# Patient Record
Sex: MALE | Race: WHITE | NOT HISPANIC OR LATINO | URBAN - NONMETROPOLITAN AREA
[De-identification: names, ages, dates, MRNs, and addresses within clinical notes are randomized per-mention and may not be internally consistent; named-entity substitution may affect disease eponyms.]

---

## 2021-02-10 ENCOUNTER — OFFICE VISIT (OUTPATIENT)
Dept: URBAN - NONMETROPOLITAN AREA SURGERY CENTER 1 | Facility: SURGERY CENTER | Age: 66
End: 2021-02-10

## 2021-02-16 ENCOUNTER — OFFICE VISIT (OUTPATIENT)
Dept: URBAN - NONMETROPOLITAN AREA SURGERY CENTER 1 | Facility: SURGERY CENTER | Age: 66
End: 2021-02-16
Payer: MEDICARE

## 2021-02-16 ENCOUNTER — CLAIMS CREATED FROM THE CLAIM WINDOW (OUTPATIENT)
Dept: URBAN - METROPOLITAN AREA CLINIC 4 | Facility: CLINIC | Age: 66
End: 2021-02-16
Payer: MEDICARE

## 2021-02-16 DIAGNOSIS — Z80.0 FAMILY HISTORY MALIGNANT NEOPLASM OF BILIARY TRACT: ICD-10-CM

## 2021-02-16 DIAGNOSIS — Z12.11 COLON CANCER SCREENING: ICD-10-CM

## 2021-02-16 DIAGNOSIS — D12.2 BENIGN NEOPLASM OF ASCENDING COLON: ICD-10-CM

## 2021-02-16 DIAGNOSIS — D12.2 ADENOMA OF ASCENDING COLON: ICD-10-CM

## 2021-02-16 PROCEDURE — 45385 COLONOSCOPY W/LESION REMOVAL: CPT | Performed by: INTERNAL MEDICINE

## 2021-02-16 PROCEDURE — G8907 PT DOC NO EVENTS ON DISCHARG: HCPCS | Performed by: INTERNAL MEDICINE

## 2021-02-16 PROCEDURE — 88305 TISSUE EXAM BY PATHOLOGIST: CPT | Performed by: PATHOLOGY

## 2021-02-26 ENCOUNTER — TELEPHONE ENCOUNTER (OUTPATIENT)
Dept: URBAN - NONMETROPOLITAN AREA CLINIC 1 | Facility: CLINIC | Age: 66
End: 2021-02-26

## 2021-04-26 ENCOUNTER — OFFICE VISIT (OUTPATIENT)
Dept: URBAN - NONMETROPOLITAN AREA CLINIC 2 | Facility: CLINIC | Age: 66
End: 2021-04-26
Payer: MEDICARE

## 2021-04-26 DIAGNOSIS — R63.4 WEIGHT LOSS: ICD-10-CM

## 2021-04-26 DIAGNOSIS — F41.9 ANXIETY: ICD-10-CM

## 2021-04-26 DIAGNOSIS — Z12.11 COLON CANCER SCREENING: ICD-10-CM

## 2021-04-26 DIAGNOSIS — R79.89 ELEVATED LIVER FUNCTION TESTS: ICD-10-CM

## 2021-04-26 DIAGNOSIS — Z86.010 PERSONAL HISTORY OF COLONIC POLYPS: ICD-10-CM

## 2021-04-26 PROCEDURE — 99214 OFFICE O/P EST MOD 30 MIN: CPT | Performed by: NURSE PRACTITIONER

## 2021-04-26 NOTE — PHYSICAL EXAM NEUROLOGIC:
oriented to person, place and time , cranial nerves 2-12 grossly intact
no LOB or unsteadiness observed/decreased step length/decreased weight-shifting ability

## 2021-04-26 NOTE — HPI-OTHER HISTORIES
4/26/21 Mr. Tyler Pickard is a 64 yo male who is referred by Dr. Pieter Negro for elevated LFTs. A copy of this document will be forwarded to the referring provider. He does not recall having elevated liver tests in the past. Recent labs reviewed. See below. He began with hyperbilirubinemia in November and had follow up labs March with hepatocellular injury. Labs improved at follow up although his bilirubin is up to 1.6. CTshows normal liver and no etiology for elevated LFTs.   Overall he is healthy. He denies any significant problems other than anxiety. Earlier this year, he started intermittent fasting and picked up running for the first time in years. He unexpectedly loss 15# at his follow up with Dr. Negro and has resumed his typical diet. He has put some of the weight back on.  He is a social drinker, no more than 8 drinks in a week but cut back in January due to anxiety and hoping this would alleviate some of his symptoms. He may have 1-2 glasses of wine a week now. Denies history of heavier etoh use. No history of accidental needle pricks, IV drug abuse, blood transfusion, tattoos. He did have jaundice as a child and was on bedrest for 1 week and his symptoms resolved. Denies f/h of liver disease.  Denies any recent viral illnesses. He did have an episode of fatigue and generally feeling unwell in January. No fever, chills. He was well enough to continue ADLs. Tested negative for COVID. Denies sick contacts. He is an . No recent travel other than to his wife's hotel in [place].   He did increase statin dosage prior to elevated LFTs but has stopped it for 6 weeks now. Also started herbal supplements and Neal tea 1 cup per day that he has now stopped. He was taking Trazodone for sleep but has stopped that for 6 weeks now and is sleeping well with melatonin 1mg nightly.   Labs: 4/7/21: ALT, 60, AST 50, alk phos 91, t bili 1.6, direct bili 0.6, indirect 1.0 3/18/21: , AST 53, alk phos 96, t bili 1.0, direct 0.35, indirect 0.75 3/4/21: ALT 74, AST 49, alk phos 61, t bili 1.6, albumin normal, CBC including plt normal. 11/3/20: ALT 38, AST 34, alk phos 71, t bili 1.9, Albumin normal, cbc including plt normal.  Recent imaging: CT A/P with contrast 3/9/21-liver unremarkable, normal gallbladder, normal bile ducts; enlarged prostate.  Colonoscopy Dr. awan 2/16/21: 2 TAs and diverticulosis--to repeat in 5 years. Noted to have f/h colon cancer in his father. TG

## 2021-05-03 ENCOUNTER — TELEPHONE ENCOUNTER (OUTPATIENT)
Dept: URBAN - METROPOLITAN AREA CLINIC 92 | Facility: CLINIC | Age: 66
End: 2021-05-03

## 2021-05-03 LAB
ACTIN (SMOOTH MUSCLE) ANTIBODY: 61
ALBUMIN: 4.7
ALKALINE PHOSPHATASE: 73
ALPHA-1-ANTITRYPSIN, SERUM: 112
ALT (SGPT): 40
ANA DIRECT: NEGATIVE
AST (SGOT): 35
BASO (ABSOLUTE): 0
BASOS: 1
BILIRUBIN, DIRECT: 0.42
BILIRUBIN, TOTAL: 1.5
BUN/CREATININE RATIO: 15
BUN: 14
CARBON DIOXIDE, TOTAL: 24
CERULOPLASMIN: 20
CHLORIDE: 103
CREATININE: 0.92
EGFR IF AFRICN AM: 100
EGFR IF NONAFRICN AM: 86
EOS (ABSOLUTE): 0
EOS: 0
FERRITIN, SERUM: 181
GGT: 164
GLUCOSE: 82
HBSAG SCREEN: NEGATIVE
HEMATOCRIT: 42.8
HEMATOLOGY COMMENTS:: (no result)
HEMOGLOBIN: 14.3
HEP A AB, IGM: NEGATIVE
HEP B CORE AB, IGM: NEGATIVE
HEP C VIRUS AB: <0.1
IMMATURE CELLS: (no result)
IMMATURE GRANS (ABS): 0
IMMATURE GRANULOCYTES: 0
IMMUNOGLOBULIN A, QN, SERUM: 131
IMMUNOGLOBULIN G, QN, SERUM: 929
INR: 1.1
IRON BIND.CAP.(TIBC): 265
IRON SATURATION: 46
IRON: 122
LIVER-KIDNEY MICROSOMAL AB: 1
LYMPHS (ABSOLUTE): 0.9
LYMPHS: 14
MCH: 32.1
MCHC: 33.4
MCV: 96
MITOCHONDRIAL (M2) ANTIBODY: <20
MONOCYTES(ABSOLUTE): 0.4
MONOCYTES: 6
NEUTROPHILS (ABSOLUTE): 5
NEUTROPHILS: 79
NRBC: (no result)
PLATELETS: 276
POTASSIUM: 4.4
PROTEIN, TOTAL: 6.5
PROTHROMBIN TIME: 11.5
RBC: 4.45
RDW: 12
SODIUM: 142
T-TRANSGLUTAMINASE (TTG) IGA: <2
UIBC: 143
WBC: 6.3

## 2021-05-11 ENCOUNTER — TELEPHONE ENCOUNTER (OUTPATIENT)
Dept: URBAN - METROPOLITAN AREA CLINIC 92 | Facility: CLINIC | Age: 66
End: 2021-05-11

## 2021-05-11 ENCOUNTER — TELEPHONE ENCOUNTER (OUTPATIENT)
Dept: URBAN - METROPOLITAN AREA CLINIC 23 | Facility: CLINIC | Age: 66
End: 2021-05-11

## 2021-05-18 ENCOUNTER — TELEPHONE ENCOUNTER (OUTPATIENT)
Dept: URBAN - METROPOLITAN AREA CLINIC 23 | Facility: CLINIC | Age: 66
End: 2021-05-18

## 2021-07-05 ENCOUNTER — LAB OUTSIDE AN ENCOUNTER (OUTPATIENT)
Dept: URBAN - METROPOLITAN AREA CLINIC 92 | Facility: CLINIC | Age: 66
End: 2021-07-05

## 2021-07-23 LAB
ACTIN (SMOOTH MUSCLE) ANTIBODY: 81
ALBUMIN: 4.5
ALKALINE PHOSPHATASE: 78
ALT (SGPT): 34
AST (SGOT): 40
BILIRUBIN, DIRECT: 0.41
BILIRUBIN, TOTAL: 1.4
BUN/CREATININE RATIO: 13
BUN: 12
CARBON DIOXIDE, TOTAL: 21
CHLORIDE: 102
CREATININE: 0.92
EGFR IF AFRICN AM: 100
EGFR IF NONAFRICN AM: 86
GGT: 95
GLUCOSE: 83
HEMATOCRIT: 42.4
HEMOGLOBIN: 14
INR: 1.1
MCH: 31.2
MCHC: 33
MCV: 94
NRBC: (no result)
PLATELETS: 250
POTASSIUM: 3.9
PROTEIN, TOTAL: 6.6
PROTHROMBIN TIME: 11.6
RBC: 4.49
RDW: 11.9
SODIUM: 141
WBC: 6.1

## 2021-07-26 ENCOUNTER — WEB ENCOUNTER (OUTPATIENT)
Dept: URBAN - NONMETROPOLITAN AREA CLINIC 2 | Facility: CLINIC | Age: 66
End: 2021-07-26

## 2021-07-26 ENCOUNTER — OFFICE VISIT (OUTPATIENT)
Dept: URBAN - NONMETROPOLITAN AREA CLINIC 2 | Facility: CLINIC | Age: 66
End: 2021-07-26
Payer: MEDICARE

## 2021-07-26 DIAGNOSIS — Z12.11 COLON CANCER SCREENING: ICD-10-CM

## 2021-07-26 DIAGNOSIS — F41.9 ANXIETY: ICD-10-CM

## 2021-07-26 DIAGNOSIS — R79.89 ELEVATED LIVER FUNCTION TESTS: ICD-10-CM

## 2021-07-26 DIAGNOSIS — R63.4 WEIGHT LOSS: ICD-10-CM

## 2021-07-26 DIAGNOSIS — Z86.010 PERSONAL HISTORY OF COLONIC POLYPS: ICD-10-CM

## 2021-07-26 PROCEDURE — 99214 OFFICE O/P EST MOD 30 MIN: CPT | Performed by: INTERNAL MEDICINE

## 2021-07-26 RX ORDER — ASCORBIC ACID 1000 MG
1 TABLET TABLET ORAL ONCE A DAY
Status: ACTIVE | COMMUNITY

## 2021-07-26 RX ORDER — DORZOLAMIDE HCL 20 MG/ML
1 DROP INTO AFFECTED EYE SOLUTION/ DROPS OPHTHALMIC THREE TIMES A DAY
Status: ACTIVE | COMMUNITY

## 2021-07-26 RX ORDER — POLYMYXIN B SULFATE AND TRIMETHOPRIM SULFATE 100000; 1 [USP'U]/ML; MG/ML
1 DROP INTO AFFECTED EYE SOLUTION/ DROPS OPHTHALMIC
Status: ACTIVE | COMMUNITY

## 2021-07-26 NOTE — HPI-OTHER HISTORIES
4/26/21 Mr. Tyler Pickard is a 66 yo male who is referred by Dr. Pieter Negro for elevated LFTs. A copy of this document will be forwarded to the referring provider. He does not recall having elevated liver tests in the past. Recent labs reviewed. See below. He began with hyperbilirubinemia in November and had follow up labs March with hepatocellular injury. Labs improved at follow up although his bilirubin is up to 1.6. CTshows normal liver and no etiology for elevated LFTs.   Overall he is healthy. He denies any significant problems other than anxiety. Earlier this year, he started intermittent fasting and picked up running for the first time in years. He unexpectedly loss 15# at his follow up with Dr. Negro and has resumed his typical diet. He has put some of the weight back on.  He is a social drinker, no more than 8 drinks in a week but cut back in January due to anxiety and hoping this would alleviate some of his symptoms. He may have 1-2 glasses of wine a week now. Denies history of heavier etoh use. No history of accidental needle pricks, IV drug abuse, blood transfusion, tattoos. He did have jaundice as a child and was on bedrest for 1 week and his symptoms resolved. Denies f/h of liver disease.  Denies any recent viral illnesses. He did have an episode of fatigue and generally feeling unwell in January. No fever, chills. He was well enough to continue ADLs. Tested negative for COVID. Denies sick contacts. He is an . No recent travel other than to his wife's hotel in [place].   He did increase statin dosage prior to elevated LFTs but has stopped it for 6 weeks now. Also started herbal supplements and Neal tea 1 cup per day that he has now stopped. He was taking Trazodone for sleep but has stopped that for 6 weeks now and is sleeping well with melatonin 1mg nightly.   Labs: 4/7/21: ALT, 60, AST 50, alk phos 91, t bili 1.6, direct bili 0.6, indirect 1.0 3/18/21: , AST 53, alk phos 96, t bili 1.0, direct 0.35, indirect 0.75 3/4/21: ALT 74, AST 49, alk phos 61, t bili 1.6, albumin normal, CBC including plt normal. 11/3/20: ALT 38, AST 34, alk phos 71, t bili 1.9, Albumin normal, cbc including plt normal.  Recent imaging: CT A/P with contrast 3/9/21-liver unremarkable, normal gallbladder, normal bile ducts; enlarged prostate.  Colonoscopy Dr. awan 2/16/21: 2 TAs and diverticulosis--to repeat in 5 years. Noted to have f/h colon cancer in his father. TG  7/26/21 Patient presents for follow up for abnormal LFTs. His LFTs on labs last week are normal except for bilirubin of 1.4. GGT has improved from 164 to 95. Smooth Muscle antibody has increased from 61 to 81. He is not drinking etoh. No new medications. He has no complaints today. TG

## 2021-07-28 ENCOUNTER — TELEPHONE ENCOUNTER (OUTPATIENT)
Dept: URBAN - METROPOLITAN AREA CLINIC 92 | Facility: CLINIC | Age: 66
End: 2021-07-28

## 2021-08-12 ENCOUNTER — TELEPHONE ENCOUNTER (OUTPATIENT)
Dept: URBAN - NONMETROPOLITAN AREA CLINIC 2 | Facility: CLINIC | Age: 66
End: 2021-08-12

## 2021-10-25 ENCOUNTER — OFFICE VISIT (OUTPATIENT)
Dept: URBAN - NONMETROPOLITAN AREA CLINIC 2 | Facility: CLINIC | Age: 66
End: 2021-10-25

## 2021-10-26 ENCOUNTER — LAB OUTSIDE AN ENCOUNTER (OUTPATIENT)
Dept: URBAN - NONMETROPOLITAN AREA CLINIC 2 | Facility: CLINIC | Age: 66
End: 2021-10-26

## 2021-10-27 LAB
ACTIN (SMOOTH MUSCLE) ANTIBODY: 43
ALBUMIN: 4.4
ALKALINE PHOSPHATASE: 75
ALT (SGPT): 20
AST (SGOT): 28
BASO (ABSOLUTE): 0
BASOS: 1
BILIRUBIN, DIRECT: 0.31
BILIRUBIN, TOTAL: 1.1
BUN/CREATININE RATIO: 15
BUN: 13
CARBON DIOXIDE, TOTAL: 23
CHLORIDE: 105
CREATININE: 0.86
EGFR IF AFRICN AM: 104
EGFR IF NONAFRICN AM: 90
EOS (ABSOLUTE): 0
EOS: 1
GGT: 62
GLUCOSE: 99
HEMATOCRIT: 42.3
HEMATOLOGY COMMENTS:: (no result)
HEMOGLOBIN: 14.2
IMMATURE CELLS: (no result)
IMMATURE GRANS (ABS): 0
IMMATURE GRANULOCYTES: 0
INR: 1
LYMPHS (ABSOLUTE): 0.9
LYMPHS: 16
MCH: 31.4
MCHC: 33.6
MCV: 94
MONOCYTES(ABSOLUTE): 0.4
MONOCYTES: 7
NEUTROPHILS (ABSOLUTE): 4.2
NEUTROPHILS: 75
NRBC: (no result)
PLATELETS: 265
POTASSIUM: 4.8
PROTEIN, TOTAL: 6.7
PROTHROMBIN TIME: 11
RBC: 4.52
RDW: 12.4
SODIUM: 141
WBC: 5.5

## 2021-10-28 ENCOUNTER — OFFICE VISIT (OUTPATIENT)
Dept: URBAN - NONMETROPOLITAN AREA CLINIC 13 | Facility: CLINIC | Age: 66
End: 2021-10-28
Payer: MEDICARE

## 2021-10-28 VITALS
HEIGHT: 71 IN | BODY MASS INDEX: 20.97 KG/M2 | WEIGHT: 149.8 LBS | DIASTOLIC BLOOD PRESSURE: 116 MMHG | SYSTOLIC BLOOD PRESSURE: 196 MMHG | HEART RATE: 70 BPM

## 2021-10-28 DIAGNOSIS — Z12.11 COLON CANCER SCREENING: ICD-10-CM

## 2021-10-28 DIAGNOSIS — R79.89 ELEVATED LIVER FUNCTION TESTS: ICD-10-CM

## 2021-10-28 DIAGNOSIS — Z86.010 PERSONAL HISTORY OF COLONIC POLYPS: ICD-10-CM

## 2021-10-28 DIAGNOSIS — F41.9 ANXIETY: ICD-10-CM

## 2021-10-28 DIAGNOSIS — R63.4 WEIGHT LOSS: ICD-10-CM

## 2021-10-28 PROCEDURE — 99214 OFFICE O/P EST MOD 30 MIN: CPT | Performed by: NURSE PRACTITIONER

## 2021-10-28 RX ORDER — ASCORBIC ACID 1000 MG
1 TABLET TABLET ORAL ONCE A DAY
Status: ACTIVE | COMMUNITY

## 2021-10-28 RX ORDER — DORZOLAMIDE HCL 20 MG/ML
1 DROP INTO AFFECTED EYE SOLUTION/ DROPS OPHTHALMIC THREE TIMES A DAY
Status: ACTIVE | COMMUNITY

## 2021-10-28 RX ORDER — POLYMYXIN B SULFATE AND TRIMETHOPRIM SULFATE 100000; 1 [USP'U]/ML; MG/ML
1 DROP INTO AFFECTED EYE SOLUTION/ DROPS OPHTHALMIC
Status: ACTIVE | COMMUNITY

## 2021-10-28 NOTE — HPI-OTHER HISTORIES
4/26/21 Mr. Tyler Pickard is a 66 yo male who is referred by Dr. Pieter Negro for elevated LFTs. A copy of this document will be forwarded to the referring provider. He does not recall having elevated liver tests in the past. Recent labs reviewed. See below. He began with hyperbilirubinemia in November and had follow up labs March with hepatocellular injury. Labs improved at follow up although his bilirubin is up to 1.6. CTshows normal liver and no etiology for elevated LFTs.   Overall he is healthy. He denies any significant problems other than anxiety. Earlier this year, he started intermittent fasting and picked up running for the first time in years. He unexpectedly loss 15# at his follow up with Dr. Negro and has resumed his typical diet. He has put some of the weight back on.  He is a social drinker, no more than 8 drinks in a week but cut back in January due to anxiety and hoping this would alleviate some of his symptoms. He may have 1-2 glasses of wine a week now. Denies history of heavier etoh use. No history of accidental needle pricks, IV drug abuse, blood transfusion, tattoos. He did have jaundice as a child and was on bedrest for 1 week and his symptoms resolved. Denies f/h of liver disease.  Denies any recent viral illnesses. He did have an episode of fatigue and generally feeling unwell in January. No fever, chills. He was well enough to continue ADLs. Tested negative for COVID. Denies sick contacts. He is an . No recent travel other than to his wife's hotel in [place].   He did increase statin dosage prior to elevated LFTs but has stopped it for 6 weeks now. Also started herbal supplements and Neal tea 1 cup per day that he has now stopped. He was taking Trazodone for sleep but has stopped that for 6 weeks now and is sleeping well with melatonin 1mg nightly.   Labs: 4/7/21: ALT, 60, AST 50, alk phos 91, t bili 1.6, direct bili 0.6, indirect 1.0 3/18/21: , AST 53, alk phos 96, t bili 1.0, direct 0.35, indirect 0.75 3/4/21: ALT 74, AST 49, alk phos 61, t bili 1.6, albumin normal, CBC including plt normal. 11/3/20: ALT 38, AST 34, alk phos 71, t bili 1.9, Albumin normal, cbc including plt normal.  Recent imaging: CT A/P with contrast 3/9/21-liver unremarkable, normal gallbladder, normal bile ducts; enlarged prostate.  Colonoscopy Dr. awan 2/16/21: 2 TAs and diverticulosis--to repeat in 5 years. Noted to have f/h colon cancer in his father.   7/26/21 Patient presents for follow up for abnormal LFTs. His LFTs on labs last week are normal except for bilirubin of 1.4. GGT has improved from 164 to 95. Smooth Muscle antibody has increased from 61 to 81. He is not drinking etoh. No new medications. He has no complaints today.   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 He completed labs last week for follow up today. His LFTs have normalized. GGT has improved to 62. Smooth muscle antibody is lower at 43 but still abnormal. CBC and BMP unremarkable.  He is doing well overall. He is asx. He reports typical pain after working out but no severe joint pains. He did start NAC about 4 months ago. we had a long discussion regarding possible liver bx however given his normal LFTs, no indication for treatment at this time. Will continue to monitor.

## 2021-11-23 ENCOUNTER — OFFICE VISIT (OUTPATIENT)
Dept: URBAN - NONMETROPOLITAN AREA CLINIC 2 | Facility: CLINIC | Age: 66
End: 2021-11-23

## 2022-01-20 ENCOUNTER — OFFICE VISIT (OUTPATIENT)
Dept: URBAN - NONMETROPOLITAN AREA CLINIC 13 | Facility: CLINIC | Age: 67
End: 2022-01-20
Payer: MEDICARE

## 2022-01-20 DIAGNOSIS — E80.4 GILBERT SYNDROME: ICD-10-CM

## 2022-01-20 DIAGNOSIS — R79.89 ABNORMAL LFTS: ICD-10-CM

## 2022-01-20 DIAGNOSIS — Z86.010 PERSONAL HISTORY OF COLONIC POLYPS: ICD-10-CM

## 2022-01-20 DIAGNOSIS — F41.9 ANXIETY: ICD-10-CM

## 2022-01-20 DIAGNOSIS — Z80.0 FAMILY HISTORY OF COLON CANCER: ICD-10-CM

## 2022-01-20 PROBLEM — 48694002: Status: ACTIVE | Noted: 2021-04-26

## 2022-01-20 LAB
ACTIN (SMOOTH MUSCLE) ANTIBODY: 66
ALBUMIN: 4.5
ALKALINE PHOSPHATASE: 82
ALT (SGPT): 55
AST (SGOT): 56
BILIRUBIN, DIRECT: 0.39
BILIRUBIN, TOTAL: 1.2
BUN/CREATININE RATIO: 14
BUN: 13
CARBON DIOXIDE, TOTAL: 25
CHLORIDE: 103
CREATININE: 0.94
EGFR IF AFRICN AM: 97
EGFR IF NONAFRICN AM: 84
GGT: 172
GLUCOSE: 80
HEMATOCRIT: 43.2
HEMOGLOBIN: 14.5
INR: 1.1
MCH: 32.3
MCHC: 33.6
MCV: 96
NRBC: (no result)
PLATELETS: 238
POTASSIUM: 4.5
PROTEIN, TOTAL: 6.5
PROTHROMBIN TIME: 11.4
RBC: 4.49
RDW: 12.5
SODIUM: 141
WBC: 7.7

## 2022-01-20 PROCEDURE — 99215 OFFICE O/P EST HI 40 MIN: CPT | Performed by: INTERNAL MEDICINE

## 2022-01-20 RX ORDER — POLYMYXIN B SULFATE AND TRIMETHOPRIM SULFATE 100000; 1 [USP'U]/ML; MG/ML
1 DROP INTO AFFECTED EYE SOLUTION/ DROPS OPHTHALMIC
Status: ACTIVE | COMMUNITY

## 2022-01-20 RX ORDER — ASCORBIC ACID 1000 MG
1 TABLET TABLET ORAL ONCE A DAY
Status: ACTIVE | COMMUNITY

## 2022-01-20 RX ORDER — DORZOLAMIDE HCL 20 MG/ML
1 DROP INTO AFFECTED EYE SOLUTION/ DROPS OPHTHALMIC THREE TIMES A DAY
Status: ACTIVE | COMMUNITY

## 2022-01-20 NOTE — HPI-TODAY'S VISIT:
Patient returns for follow-up of abnormal liver function of unknown etiology.  Serologic evaluation has been negative other than a positive anti-smooth muscle antibody.  CT scan of the abdomen showed a normal liver.  His transaminases had returned some normal and the anti-smooth muscle antibody titer was lower last blood draws.  He did have an elevated bilirubin with indirect greater than direct suggesting Gilbert's  syndrome. Currently, the patient is asymptomatic.  He has no right upper quadrant pain, fever or night sweats.  He denies jaundice.  His appetite is good and his weight is stable. Patient is currently taking doxycycline daily for an ophthalmologic problem.  He is also been taking N-acetylcysteine daily for the last 6 to 8 months for anxiety.  He is not on any other new medications.

## 2022-01-25 ENCOUNTER — TELEPHONE ENCOUNTER (OUTPATIENT)
Dept: URBAN - METROPOLITAN AREA CLINIC 92 | Facility: CLINIC | Age: 67
End: 2022-01-25

## 2022-01-28 ENCOUNTER — LAB OUTSIDE AN ENCOUNTER (OUTPATIENT)
Dept: URBAN - NONMETROPOLITAN AREA CLINIC 13 | Facility: CLINIC | Age: 67
End: 2022-01-28

## 2022-02-10 ENCOUNTER — TELEPHONE ENCOUNTER (OUTPATIENT)
Dept: URBAN - NONMETROPOLITAN AREA CLINIC 2 | Facility: CLINIC | Age: 67
End: 2022-02-10

## 2022-02-25 LAB
ALBUMIN: 4.6
ALKALINE PHOSPHATASE: 73
ALT (SGPT): 29
AST (SGOT): 31
BILIRUBIN, DIRECT: 0.41
BILIRUBIN, TOTAL: 1.6
PROTEIN, TOTAL: 6.7

## 2022-03-08 ENCOUNTER — TELEPHONE ENCOUNTER (OUTPATIENT)
Dept: URBAN - NONMETROPOLITAN AREA CLINIC 2 | Facility: CLINIC | Age: 67
End: 2022-03-08

## 2022-03-09 ENCOUNTER — LAB OUTSIDE AN ENCOUNTER (OUTPATIENT)
Dept: URBAN - NONMETROPOLITAN AREA CLINIC 2 | Facility: CLINIC | Age: 67
End: 2022-03-09

## 2022-03-17 LAB
ACTIN (SMOOTH MUSCLE) ANTIBODY: 56
GGT: 66

## 2022-03-22 ENCOUNTER — OFFICE VISIT (OUTPATIENT)
Dept: URBAN - METROPOLITAN AREA CLINIC 86 | Facility: CLINIC | Age: 67
End: 2022-03-22

## 2022-05-22 ENCOUNTER — TELEPHONE ENCOUNTER (OUTPATIENT)
Dept: URBAN - METROPOLITAN AREA CLINIC 92 | Facility: CLINIC | Age: 67
End: 2022-05-22

## 2022-05-24 ENCOUNTER — LAB OUTSIDE AN ENCOUNTER (OUTPATIENT)
Dept: URBAN - METROPOLITAN AREA TELEHEALTH 2 | Facility: TELEHEALTH | Age: 67
End: 2022-05-24

## 2022-05-24 ENCOUNTER — OFFICE VISIT (OUTPATIENT)
Dept: URBAN - METROPOLITAN AREA TELEHEALTH 2 | Facility: TELEHEALTH | Age: 67
End: 2022-05-24
Payer: MEDICARE

## 2022-05-24 DIAGNOSIS — E80.4 GILBERT SYNDROME: ICD-10-CM

## 2022-05-24 DIAGNOSIS — K71.9 DRUG INDUCED LIVER DISEASE: ICD-10-CM

## 2022-05-24 DIAGNOSIS — R79.89 ABNORMAL LFTS: ICD-10-CM

## 2022-05-24 DIAGNOSIS — Z79.899 HIGH RISK MEDICATION USE: ICD-10-CM

## 2022-05-24 PROBLEM — 41446000 BLEPHARITIS: Status: ACTIVE | Noted: 2022-05-24

## 2022-05-24 PROBLEM — 409712001 MITRAL VALVE PROLAPSE: Status: ACTIVE | Noted: 2022-05-24

## 2022-05-24 PROBLEM — 428283002 HISTORY OF POLYP OF COLON: Status: ACTIVE | Noted: 2022-05-24

## 2022-05-24 PROBLEM — 371622005 ELEVATED BLOOD-PRESSURE READING WITHOUT DIAGNOSIS OF HYPERTENSION: Status: ACTIVE | Noted: 2022-05-24

## 2022-05-24 PROCEDURE — 99214 OFFICE O/P EST MOD 30 MIN: CPT

## 2022-05-24 PROCEDURE — 99244 OFF/OP CNSLTJ NEW/EST MOD 40: CPT

## 2022-05-24 RX ORDER — DORZOLAMIDE HCL 20 MG/ML
1 DROP INTO AFFECTED EYE SOLUTION/ DROPS OPHTHALMIC THREE TIMES A DAY
Status: DISCONTINUED | COMMUNITY

## 2022-05-24 RX ORDER — TIMOLOL/DORZOLAMIDE/LATANOP/PF 0.5-2-.005
AS DIRECTED DROPS OPHTHALMIC (EYE)
Status: ACTIVE | COMMUNITY

## 2022-05-24 RX ORDER — POLYMYXIN B SULFATE AND TRIMETHOPRIM SULFATE 100000; 1 [USP'U]/ML; MG/ML
1 DROP INTO AFFECTED EYE SOLUTION/ DROPS OPHTHALMIC
Status: DISCONTINUED | COMMUNITY

## 2022-05-24 RX ORDER — ASCORBIC ACID 1000 MG
1 TABLET TABLET ORAL ONCE A DAY
Status: ACTIVE | COMMUNITY

## 2022-05-24 NOTE — HPI-TODAY'S VISIT:
Patient is a 66-year-old male and under the care of Dr Thai Monique and we are asked to see regarding abnormal liver labs and possible immune issues.  A copy of the note will be sent to the referring provider.  Pt says 2021 he had lost some weight and said his primary did tests and his lfts were up and surprising for him.  Pt says said at time was on some medications and among them was doxycycline and a sleep aid and also atorvastatin and some herbal medications (sleep meds).   He started to remove them and the lfts were coming down and he says most of them in the ok range over time and he says that some that stayed up. GGTP down but the labs up enough that he referred him to Dr Monique and did asma and that was up.   Pt says that the enzymes down and restarted the doxycycline and it went back up and then stopped the doxycycline and dropped some but the asma did not drop and the ggtp did not drop.  Dr Monique said not clear and recommended to do more work up.  Chart review: Patient seen on January 20, 2022 by Dr. Monique. Patient has abnormal liver labs with labs to continue to show mild elevation at that time.  Patient had an elevated gamma GTP as well as an elevated ASMA.  He discussed the possibility of doing a biopsy. Patient also suspected to have Gilbert syndrome as well. Per Dr. Monique's note patient had a normal CT of the abdomen with regards to the liver. He had no symptoms.  He had been taking daily doxycycline for an ophthalmologic problem as well as n- acetylcysteine for the last 6 to 8 months. Last exposure to doxycycline was in the jan and feb 2022.  Patient at that visit was noted to have a blood pressure 197/95 (very anxious and has white coat syndrome and says normally ok) and a BMI of 21.67.  The medicine list he was taking at that visit melatonin, nacetylcysteine, multivitamin, vitamin C, Polytrim eyedrops, dorzolamide eyedrops, doxycycline, and vitamin D.  Certainly very possible that this could be related to the doxycycline triggering immunity issue. There are case reports that is triggering even overt autoimmune hepatitis.  Patient also did in February 2021 colonoscopy that showed 2 sessile polyps in the ascending colon that were small and removed with cold snare.  Multiple small large mouth diverticuli are seen in sigmoid and descending colon.  Lab review showed in October 2021 a smooth muscle antibody of 43 INR 1.0 AST 28 ALT 20 alk phos 75 bilirubin 1.1 direct bilirubin 0.31 and albumin 4.4.  Sodium 141 potassium 4.8 BUN of 13 creatinine 0.86 glucose 99.  White blood cell count 5.5 hemoglobin 14.2 platelet count 265. That was the first time off the meds and those were the normal labs.  January 2022 gamma GTP was elevated at 172 smooth muscle antibody 66 INR 1.1 white cell count 7.7 hemoglobin 14.5 plate count 238 and AST 56 ALT 55 bilirubin 1.2 direct 0.39 alk phos 82 sodium 141 potassium 4.5 BUN of 13 creatinine 0.94 glucose of 88. That was the reexposure time and back on the doxycycline at the time.  February 25, 2022 AST 31 ALT 29 total bili 1.6 direct bilirubin 0.41 albumin 4.6 and smooth muscle antibody 56.  Gamma GTP 66. That was off the meds.  April 2022 did redo labs with primary md and says that the asma and ggtp up and others marginal.  Perlivertox: Doxycycline has been associated with rare instances of hepatic injury, generally arising within 1 to 2 weeks of starting therapy, sometimes with a history of previous administration of the agent without injury. The pattern of injury ranges from hepatocellular to cholestatic and is probably most commonly mixed. The onset is often abrupt and can be accompanied by signs of hypersensitivity, such as fever, rash and eosinophilia (DRESS syndrome). Recovery is usually rapid and usually complete within 4 to 6 weeks. However, instances of severe and prolonged cholestatic liver injury have been reported with oral doxycycline. The autoimmune-like hepatitis that has been described with minocycline has not been linked to doxycycline, despite similarities in chemical structure and similar indications and uses, perhaps because it is used less frequency in a low dose, long term regimen. High dose intravenous doxycycline can cause acute fatty liver typical of that caused by intravenous tetracycline, particularly in susceptible patients such as pregnant women. This type of injury is, however, quite rare. Nevertheless, for these reasons, the duration and dose of parenteral doxycycline therapy should be minimized. Likelihood score: B (highly likely but rare cause of clinically apparent liver injury).  Pt off the doxycycline now form that time.  Need to get the newer labs that he did.  This may be a process of seeing off the medicine.  I am hoping that he did not trigger a full blown aih.  The immune markers may take 3-6 or a 12 months to go back to normal. Sometimes with a rechallenge can worsen the picture.  Plan: 1.	Would keep off doxycycline as that is the most suspicious medicine. 2.     Check IGG and IGM.  Long term we need to follow the othe rAna and ama and asma. 3.	If labs stay normal to low it may be of function for the immune process to wear off. 4.     I am hopefully that he did not cross over to immune and if so we may need to discuss a bx. 5.     If so need to look at bx.  Stressed to pt the need for social distancing and strict handwashing and wearing a mask and to follow any other new or added CDC recommendations as this is an evolving target.  Duration of the visit was 54 minutes with 15 minutes of chart prep and 39 minutes by phone as failed to dox video with his phone due to phone and location issues for this TeleMed visit with time reviewing their prior and recent records and labs and discussing their current status and future plans for care for the patient.

## 2022-06-03 ENCOUNTER — OFFICE VISIT (OUTPATIENT)
Dept: URBAN - NONMETROPOLITAN AREA CLINIC 1 | Facility: CLINIC | Age: 67
End: 2022-06-03

## 2022-06-13 ENCOUNTER — LAB OUTSIDE AN ENCOUNTER (OUTPATIENT)
Dept: URBAN - METROPOLITAN AREA TELEHEALTH 2 | Facility: TELEHEALTH | Age: 67
End: 2022-06-13

## 2022-06-17 ENCOUNTER — OFFICE VISIT (OUTPATIENT)
Dept: URBAN - NONMETROPOLITAN AREA CLINIC 1 | Facility: CLINIC | Age: 67
End: 2022-06-17

## 2022-07-01 ENCOUNTER — OFFICE VISIT (OUTPATIENT)
Dept: URBAN - NONMETROPOLITAN AREA CLINIC 2 | Facility: CLINIC | Age: 67
End: 2022-07-01

## 2022-07-01 RX ORDER — TIMOLOL/DORZOLAMIDE/LATANOP/PF 0.5-2-.005
AS DIRECTED DROPS OPHTHALMIC (EYE)
Status: ACTIVE | COMMUNITY

## 2022-07-01 RX ORDER — ASCORBIC ACID 1000 MG
1 TABLET TABLET ORAL ONCE A DAY
Status: ACTIVE | COMMUNITY

## 2022-07-02 ENCOUNTER — TELEPHONE ENCOUNTER (OUTPATIENT)
Dept: URBAN - METROPOLITAN AREA CLINIC 92 | Facility: CLINIC | Age: 67
End: 2022-07-02

## 2022-07-02 LAB
ALBUMIN: 4.5
ALKALINE PHOSPHATASE: 78
ALT (SGPT): 17
AST (SGOT): 23
BILIRUBIN, DIRECT: 0.26
BILIRUBIN, TOTAL: 0.7
PROTEIN, TOTAL: 6.6

## 2022-07-02 NOTE — HPI-TODAY'S VISIT:
Dear Tyler Pickard, July 1 labs show albumin 4.5 bilirubin normal at 0.7 and direct bilirubin 0.26.  Alkaline phosphatase 78 AST 23 down from 31.  ALT 17 down from 29.  Back in January AST 56 and ALT 55. As you recall we suspected the doxycycline as the potential cause of this liver issue.  Very happy to see the labs coming down this low off of it. Dr Magana

## 2022-07-04 ENCOUNTER — LAB OUTSIDE AN ENCOUNTER (OUTPATIENT)
Dept: URBAN - METROPOLITAN AREA TELEHEALTH 2 | Facility: TELEHEALTH | Age: 67
End: 2022-07-04

## 2022-07-14 ENCOUNTER — OFFICE VISIT (OUTPATIENT)
Dept: URBAN - NONMETROPOLITAN AREA CLINIC 13 | Facility: CLINIC | Age: 67
End: 2022-07-14

## 2022-07-20 ENCOUNTER — OFFICE VISIT (OUTPATIENT)
Dept: URBAN - NONMETROPOLITAN AREA CLINIC 13 | Facility: CLINIC | Age: 67
End: 2022-07-20

## 2022-07-25 ENCOUNTER — LAB OUTSIDE AN ENCOUNTER (OUTPATIENT)
Dept: URBAN - METROPOLITAN AREA TELEHEALTH 2 | Facility: TELEHEALTH | Age: 67
End: 2022-07-25

## 2022-07-28 ENCOUNTER — OFFICE VISIT (OUTPATIENT)
Dept: URBAN - METROPOLITAN AREA TELEHEALTH 2 | Facility: TELEHEALTH | Age: 67
End: 2022-07-28
Payer: MEDICARE

## 2022-07-28 VITALS — BODY MASS INDEX: 20.72 KG/M2 | HEIGHT: 71 IN | WEIGHT: 148 LBS

## 2022-07-28 DIAGNOSIS — E80.4 GILBERT SYNDROME: ICD-10-CM

## 2022-07-28 DIAGNOSIS — Z79.899 HIGH RISK MEDICATION USE: ICD-10-CM

## 2022-07-28 DIAGNOSIS — K71.9 DRUG INDUCED LIVER DISEASE: ICD-10-CM

## 2022-07-28 DIAGNOSIS — R79.89 ABNORMAL LFTS: ICD-10-CM

## 2022-07-28 PROCEDURE — 99214 OFFICE O/P EST MOD 30 MIN: CPT

## 2022-07-28 RX ORDER — TIMOLOL/DORZOLAMIDE/LATANOP/PF 0.5-2-.005
AS DIRECTED DROPS OPHTHALMIC (EYE)
Status: ACTIVE | COMMUNITY

## 2022-07-28 RX ORDER — ASCORBIC ACID 1000 MG
1 TABLET TABLET ORAL ONCE A DAY
Status: ACTIVE | COMMUNITY

## 2022-07-28 NOTE — HPI-TODAY'S VISIT:
Patient is a 66-year-old male and last may 2022 and seeing Balbina Fall and we are asked to see regarding abnormal liver labs and possible immune issues.  A copy of the note will be sent to the referring provider.  July 1 labs show albumin 4.5 bilirubin normal at 0.7 and direct bilirubin 0.26.  Alkaline phosphatase 78 AST 23 down from 31.  ALT 17 down from 29.  Back in January AST 56 and ALT 55. Ideal alt less than 35.  As you recall we suspected the doxycycline as the potential cause of this liver issue.  Very happy to see the labs coming down this low off of it.  He had stopped that before the may visit.  Prior mentioned in 2021 he had lost some weight and said his primary did tests and his lfts were up and surprising for him.  Pt says said at time was on some medications and among them was doxycycline and a sleep aid and also atorvastatin and some herbal medications (sleep meds).   He started to remove them and the lfts were coming down and he says most of them in the ok range over time and he says that some that stayed up. GGTP down but the labs up enough that he referred him to Dr Monique and did asma and that was up.   Pt says that the enzymes down and restarted the doxycycline and it went back up and then stopped the doxycycline and dropped some but the asma did not drop and the ggtp did not drop.  Patient seen on January 20, 2022 by Dr. Monique. Patient has abnormal liver labs with labs to continue to show mild elevation at that time.  Patient had an elevated gamma GTP as well as an elevated ASMA.  He discussed the possibility of doing a biopsy. Patient also suspected to have Gilbert syndrome as well. Per Dr. Monique's note patient had a normal CT of the abdomen with regards to the liver. He had no symptoms.  He had been taking daily doxycycline for an ophthalmologic problem as well as n- acetylcysteine for the last 6 to 8 months.  Patient at that visit was noted to have a blood pressure 197/95 (very anxious and has white coat syndrome and says normally ok) and a BMI of 21.67.  Certainly very possible that this could be related to the doxycycline triggering immunity issue. There are case reports that is triggering even overt autoimmune hepatitis.  Patient also did in February 2021 colonoscopy that showed 2 sessile polyps in the ascending colon that were small and removed with cold snare.  Multiple small large mouth diverticuli are seen in sigmoid and descending colon.  October 2021 a smooth muscle antibody of 43 INR 1.0 AST 28 ALT 20 alk phos 75 bilirubin 1.1 direct bilirubin 0.31 and albumin 4.4.  Sodium 141 potassium 4.8 BUN of 13 creatinine 0.86 glucose 99.  White blood cell count 5.5 hemoglobin 14.2 platelet count 265. That was the first time off the meds and those were the normal labs.  January 2022 gamma GTP was elevated at 172 smooth muscle antibody 66 INR 1.1 white cell count 7.7 hemoglobin 14.5 plate count 238 and AST 56 ALT 55 bilirubin 1.2 direct 0.39 alk phos 82 sodium 141 potassium 4.5 BUN of 13 creatinine 0.94 glucose of 88. That was the reexposure time and back on the doxycycline at the time.  February 25, 2022 AST 31 ALT 29 total bili 1.6 direct bilirubin 0.41 albumin 4.6 and smooth muscle antibody 56.  Gamma GTP 66. That was off the meds.  April 2022 did redo labs with primary md and says that the asma and ggtp up and others marginal.  Perlivertox: Doxycycline has been associated with rare instances of hepatic injury, generally arising within 1 to 2 weeks of starting therapy, sometimes with a history of previous administration of the agent without injury. The pattern of injury ranges from hepatocellular to cholestatic and is probably most commonly mixed. The onset is often abrupt and can be accompanied by signs of hypersensitivity, such as fever, rash and eosinophilia (DRESS syndrome). Recovery is usually rapid and usually complete within 4 to 6 weeks. However, instances of severe and prolonged cholestatic liver injury have been reported with oral doxycycline. The autoimmune-like hepatitis that has been described with minocycline has not been linked to doxycycline, despite similarities in chemical structure and similar indications and uses, perhaps because it is used less frequency in a low dose, long term regimen. High dose intravenous doxycycline can cause acute fatty liver typical of that caused by intravenous tetracycline, particularly in susceptible patients such as pregnant women. This type of injury is, however, quite rare. Nevertheless, for these reasons, the duration and dose of parenteral doxycycline therapy should be minimized. Likelihood score: B (highly likely but rare cause of clinically apparent liver injury).  The immune markers may take 3-6 or a 12 months to go back to normal. Sometimes with a rechallenge can worsen the picture.  Plan: 1.	Would keep off doxycycline as that was clearly linked to the changes.  2.     We will check asma dec. 3.     We would redo the labs in dec. 4.     Telemed in dec to go over that.  Stressed to pt the need for social distancing and strict handwashing and wearing a mask and to follow any other new or added CDC recommendations as this is an evolving target.  Duration of the visit was 32  minutes with 10 minutes of chart prep and 22 minutes by hector for this TeleMed visit with time reviewing their prior and recent records and labs and discussing their current status and future plans for care for the patient.

## 2022-08-12 ENCOUNTER — TELEPHONE ENCOUNTER (OUTPATIENT)
Dept: URBAN - METROPOLITAN AREA CLINIC 92 | Facility: CLINIC | Age: 67
End: 2022-08-12

## 2022-08-12 ENCOUNTER — OFFICE VISIT (OUTPATIENT)
Dept: URBAN - NONMETROPOLITAN AREA CLINIC 1 | Facility: CLINIC | Age: 67
End: 2022-08-12
Payer: MEDICARE

## 2022-08-12 DIAGNOSIS — K76.89 ABNORMAL FINDING ON LIVER FUNCTION: ICD-10-CM

## 2022-08-12 PROCEDURE — 76705 ECHO EXAM OF ABDOMEN: CPT

## 2022-08-12 PROCEDURE — 93975 VASCULAR STUDY: CPT

## 2022-08-12 RX ORDER — TIMOLOL/DORZOLAMIDE/LATANOP/PF 0.5-2-.005
AS DIRECTED DROPS OPHTHALMIC (EYE)
Status: ACTIVE | COMMUNITY

## 2022-08-12 RX ORDER — ASCORBIC ACID 1000 MG
1 TABLET TABLET ORAL ONCE A DAY
Status: ACTIVE | COMMUNITY

## 2022-08-12 NOTE — HPI-TODAY'S VISIT:
Muna Pickard, Aug 12 u.s shows no gallstones.  Gallbladder wall has normal thickness. Common bile duct normal at 4 mm. Liver was homogeneous/even in echotexture.  No focal liver abnormality was noted. Image pancreas appeared unremarkable. Right kidney measured 10.4 cm with no hydronephrosis. Renal echogenicity was within normal limits. Spleen was normal at 8.4 cm. Liver vessels were patent. Good to note that the u.s did not show any changes. Dr Magana

## 2022-09-12 ENCOUNTER — LAB OUTSIDE AN ENCOUNTER (OUTPATIENT)
Dept: URBAN - METROPOLITAN AREA TELEHEALTH 2 | Facility: TELEHEALTH | Age: 67
End: 2022-09-12

## 2022-12-01 ENCOUNTER — LAB OUTSIDE AN ENCOUNTER (OUTPATIENT)
Dept: URBAN - METROPOLITAN AREA TELEHEALTH 2 | Facility: TELEHEALTH | Age: 67
End: 2022-12-01

## 2022-12-13 ENCOUNTER — TELEPHONE ENCOUNTER (OUTPATIENT)
Dept: URBAN - NONMETROPOLITAN AREA CLINIC 13 | Facility: CLINIC | Age: 67
End: 2022-12-13

## 2022-12-13 PROBLEM — 166643006 LIVER ENZYMES ABNORMAL: Status: ACTIVE | Noted: 2022-05-24

## 2022-12-23 ENCOUNTER — TELEPHONE ENCOUNTER (OUTPATIENT)
Dept: URBAN - METROPOLITAN AREA CLINIC 86 | Facility: CLINIC | Age: 67
End: 2022-12-23

## 2022-12-23 ENCOUNTER — TELEPHONE ENCOUNTER (OUTPATIENT)
Dept: URBAN - METROPOLITAN AREA CLINIC 92 | Facility: CLINIC | Age: 67
End: 2022-12-23

## 2022-12-23 LAB
ACTIN (SMOOTH MUSCLE) ANTIBODY (IGG): 45
ALBUMIN/GLOBULIN RATIO: 2.1
ALBUMIN: 4.5
ALKALINE PHOSPHATASE: 66
ALT (SGPT): 16
AST (SGOT): 23
BILIRUBIN, DIRECT: 0.3
BILIRUBIN, INDIRECT: 0.9
BILIRUBIN, TOTAL: 1.2
GLOBULIN: 2.1
HEP A AB, IGM: (no result)
HEPATITIS A AB, TOTAL: REACTIVE
HEPATITIS B SURFACE AB IMMUNITY, QN: 469
IGG, SUBCLASS 4: 79.2
PHOSPHATIDYLETHANOLAMINE: <10
PROTEIN, TOTAL: 6.6

## 2022-12-23 NOTE — HPI-TODAY'S VISIT:
Dear Tyler Iglesias, December 13 labs show IgG subclass 4 was normal at 79.2. 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  It tends to run higher when you are fasting for labs and tends to be lower when you are not fasting for labs. Alkaline phosphatase is down to 66 AST stable at 23 and ALT stable at 16. Anti-smooth muscle antibody was lower at 45 from 56 and prior 66 in January of this year. It is still slightly up though. As you recall, we had the prior medicine that was suspected to be causing the liver reaction and its removal improved labs and the hope is for these immune markers to also normalize over time. They did not run the IgM total on you as they apparently had a  labcode change back in June when you were last seen and the old code for IgM was changed without us being advised/aware of to this new code for another test.  We will need to order the IGM test with your next labs. Hepatitis B surface antibody was positive at a strong titer of 469.  Hep A IgM was negative but the hep A total was positive indicating remotely developed immunity being present.  Many times when the hep A total is positive they do a reflex hep A IgM to confirm that it was not a recent antibody development. Dr. Magana

## 2023-01-09 ENCOUNTER — OFFICE VISIT (OUTPATIENT)
Dept: URBAN - METROPOLITAN AREA TELEHEALTH 2 | Facility: TELEHEALTH | Age: 68
End: 2023-01-09
Payer: MEDICARE

## 2023-01-09 ENCOUNTER — LAB OUTSIDE AN ENCOUNTER (OUTPATIENT)
Dept: URBAN - METROPOLITAN AREA TELEHEALTH 2 | Facility: TELEHEALTH | Age: 68
End: 2023-01-09

## 2023-01-09 VITALS — WEIGHT: 150 LBS | HEIGHT: 71 IN | BODY MASS INDEX: 21 KG/M2

## 2023-01-09 DIAGNOSIS — R79.89 ABNORMAL LFTS: ICD-10-CM

## 2023-01-09 DIAGNOSIS — E80.4 GILBERT SYNDROME: ICD-10-CM

## 2023-01-09 DIAGNOSIS — E78.5 HYPERLIPIDEMIA: ICD-10-CM

## 2023-01-09 DIAGNOSIS — K71.9 DRUG INDUCED LIVER DISEASE: ICD-10-CM

## 2023-01-09 PROBLEM — 55822004 HYPERLIPIDEMIA: Status: ACTIVE | Noted: 2023-01-09

## 2023-01-09 PROBLEM — 23986001 GLAUCOMA: Status: ACTIVE | Noted: 2022-05-24

## 2023-01-09 PROBLEM — 428283002: Status: ACTIVE | Noted: 2021-04-26

## 2023-01-09 PROBLEM — 409063005 COUNSELING: Status: ACTIVE | Noted: 2022-05-22

## 2023-01-09 PROBLEM — 19416009 CONGENITAL ANOMALY OF EYE: Status: ACTIVE | Noted: 2022-05-22

## 2023-01-09 PROCEDURE — 99214 OFFICE O/P EST MOD 30 MIN: CPT

## 2023-01-09 RX ORDER — TIMOLOL/DORZOLAMIDE/LATANOP/PF 0.5-2-.005
AS DIRECTED DROPS OPHTHALMIC (EYE)
Status: ACTIVE | COMMUNITY

## 2023-01-09 RX ORDER — ASCORBIC ACID 1000 MG
1 TABLET TABLET ORAL ONCE A DAY
Status: ACTIVE | COMMUNITY

## 2023-01-09 NOTE — HPI-TODAY'S VISIT:
Patient is a 67-year-old male and last Aug 2022 and seeing Balbina Fall and we are asked to see regarding abnormal liver labs and possible immune issues.  A copy of the note will be sent to the referring provider.  December 13 labs show IgG subclass 4 was normal at 79.2. 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  It tends to run higher when you are fasting for labs and tends to be lower when you are not fasting for labs. Alkaline phosphatase is down to 66 AST stable at 23 and ALT stable at 16. Anti-smooth muscle antibody was lower at 45 from 56 and prior 66 in January of this year. It is still slightly up though. As you recall, we had the prior medicine that was suspected to be causing the liver reaction and its removal improved labs and the hope is for these immune markers to also normalize over time. They did not run the IgM total on you as they apparently had a  labcode change back in June when you were last seen and the old code for IgM was changed without us being advised/aware of to this new code for another test.  We will need to order the IGM test with your next labs. Hepatitis B surface antibody was positive at a strong titer of 469.  Hep A IgM was negative but the hep A total was positive indicating remotely developed immunity being present.  Many times when the hep A total is positive they do a reflex hep A IgM to confirm that it was not a recent antibody development.  He had hepatitis age 6.  Aug 12 u.s shows no gallstones.  Gallbladder wall has normal thickness. Common bile duct normal at 4 mm. Liver was homogeneous/even in echotexture.  No focal liver abnormality was noted. Image pancreas appeared unremarkable. Right kidney measured 10.4 cm with no hydronephrosis. Renal echogenicity was within normal limits. Spleen was normal at 8.4 cm. Liver vessels were patent. Good to note that the u.s did not show any changes.  July 1 labs show albumin 4.5 bilirubin normal at 0.7 and direct bilirubin 0.26.  Alkaline phosphatase 78 AST 23 down from 31.  ALT 17 down from 29.  Back in January AST 56 and ALT 55. Ideal alt less than 35.  As you recall we suspected the doxycycline as the potential cause of this liver issue.  Very happy to see the labs coming down this low off of it.  He had stopped that before the may visit.  Prior mentioned in 2021 he had lost some weight and said his primary did tests and his lfts were up and surprising for him.  Pt says said at time was on some medications and among them was doxycycline and a sleep aid and also atorvastatin and some herbal medications (sleep meds).   He started to remove them and the lfts were coming down and he says most of them in the ok range over time and he says that some that stayed up. GGTP down but the labs up enough that he referred him to Dr Monique and did asma and that was up.   Pt says that the enzymes down and restarted the doxycycline and it went back up and then stopped the doxycycline and dropped some but the asma did not drop and the ggtp did not drop.  Patient seen on January 20, 2022 by Dr. Monique. Patient has abnormal liver labs with labs to continue to show mild elevation at that time.  Patient had an elevated gamma GTP as well as an elevated ASMA.  He discussed the possibility of doing a biopsy. Patient also suspected to have Gilbert syndrome as well. Per Dr. Monique's note patient had a normal CT of the abdomen with regards to the liver. He had no symptoms.  He had been taking daily doxycycline for an ophthalmologic problem as well as n- acetylcysteine for the last 6 to 8 months.  Patient at that visit was noted to have a blood pressure 197/95 (very anxious and has white coat syndrome and says normally ok) and a BMI of 21.67. He says bp normally was good and tazley has days where bp is up 160/90 and has to take some medicine like lisinopril.  Certainly very possible that this could be related to the doxycycline triggering immunity issue. There are case reports that is triggering even overt autoimmune hepatitis.  Patient also did in February 2021 colonoscopy that showed 2 sessile polyps in the ascending colon that were small and removed with cold snare.  Multiple small large mouth diverticuli are seen in sigmoid and descending colon.  October 2021 a smooth muscle antibody of 43 INR 1.0 AST 28 ALT 20 alk phos 75 bilirubin 1.1 direct bilirubin 0.31 and albumin 4.4.  Sodium 141 potassium 4.8 BUN of 13 creatinine 0.86 glucose 99.  White blood cell count 5.5 hemoglobin 14.2 platelet count 265. That was the first time off the meds and those were the normal labs.  January 2022 gamma GTP was elevated at 172 smooth muscle antibody 66 INR 1.1 white cell count 7.7 hemoglobin 14.5 plate count 238 and AST 56 ALT 55 bilirubin 1.2 direct 0.39 alk phos 82 sodium 141 potassium 4.5 BUN of 13 creatinine 0.94 glucose of 88. That was the reexposure time and back on the doxycycline at the time.  February 25, 2022 AST 31 ALT 29 total bili 1.6 direct bilirubin 0.41 albumin 4.6 and smooth muscle antibody 56.  Gamma GTP 66. That was off the meds.  April 2022 did redo labs with primary md and says that the asma and ggtp up and others marginal.  Perlivertox: Doxycycline has been associated with rare instances of hepatic injury, generally arising within 1 to 2 weeks of starting therapy, sometimes with a history of previous administration of the agent without injury. The pattern of injury ranges from hepatocellular to cholestatic and is probably most commonly mixed. The onset is often abrupt and can be accompanied by signs of hypersensitivity, such as fever, rash and eosinophilia (DRESS syndrome). Recovery is usually rapid and usually complete within 4 to 6 weeks. However, instances of severe and prolonged cholestatic liver injury have been reported with oral doxycycline. The autoimmune-like hepatitis that has been described with minocycline has not been linked to doxycycline, despite similarities in chemical structure and similar indications and uses, perhaps because it is used less frequency in a low dose, long term regimen. High dose intravenous doxycycline can cause acute fatty liver typical of that caused by intravenous tetracycline, particularly in susceptible patients such as pregnant women. This type of injury is, however, quite rare. Nevertheless, for these reasons, the duration and dose of parenteral doxycycline therapy should be minimized. Likelihood score: B (highly likely but rare cause of clinically apparent liver injury).  The immune markers may take 3-6 or a 12 months to go back to normal. Sometimes with a rechallenge can worsen the picture.  NAC used by him. It is not toxic to liver. Uses for anxiety. Used SWAPNA in trials for tx liver injury.  Has been on atorvastatin and wants to take a low dose of this daily and would need to look at taking a low dose of this and with close labs. Atorvastatin can also trigger auotimmunity.  Plan: 1.	Would continue to keep him off doxycycline as that was clearly linked to the changes and dropping off it. 2.     We will check asma in 6m. 3.     We would redo the labs in 1m and 3m and 6m. 4.     Telemed in 6m.  Stressed to pt the need for social distancing and strict handwashing and wearing a mask and to follow any other new or added CDC recommendations as this is an evolving target.  Duration of the visit was 38 minutes with 10 minutes of chart prep and 28 minutes by hector for this TeleMed visit with time reviewing their prior and recent records and labs and discussing their current status and future plans for care for the patient.

## 2023-01-10 PROBLEM — 161646004 H/O: HIGH RISK MEDICATION: Status: ACTIVE | Noted: 2022-05-22

## 2023-01-10 PROBLEM — 128241005 INFLAMMATORY DISEASE OF LIVER: Status: ACTIVE | Noted: 2022-05-24

## 2023-01-10 PROBLEM — 27503000: Status: ACTIVE | Noted: 2022-01-20

## 2023-01-27 ENCOUNTER — OFFICE VISIT (OUTPATIENT)
Dept: URBAN - NONMETROPOLITAN AREA CLINIC 1 | Facility: CLINIC | Age: 68
End: 2023-01-27
Payer: MEDICARE

## 2023-01-27 DIAGNOSIS — R79.89 ABNORMAL LFTS: ICD-10-CM

## 2023-01-27 PROCEDURE — 76705 ECHO EXAM OF ABDOMEN: CPT

## 2023-01-27 PROCEDURE — 93975 VASCULAR STUDY: CPT

## 2023-01-31 ENCOUNTER — TELEPHONE ENCOUNTER (OUTPATIENT)
Dept: URBAN - METROPOLITAN AREA CLINIC 92 | Facility: CLINIC | Age: 68
End: 2023-01-31

## 2023-01-31 PROBLEM — 166603001: Status: ACTIVE | Noted: 2022-01-20

## 2023-01-31 NOTE — HPI-TODAY'S VISIT:
Muna Iglesias, January 27 ultrasound shows the liver to have normal echogenicity and no discrete lesions. No gallstones seen. Common bile duct normal at 4.3 mm. Pancreas was normal where seen. Right kidney 10.4 cm with no hydronephrosis. Spleen 8.5 cm. Liver vessels patent as expected. Overaa the liver had normal liver echogenicity and no suspicious lesions. Dr. Magana

## 2023-02-06 ENCOUNTER — LAB OUTSIDE AN ENCOUNTER (OUTPATIENT)
Dept: URBAN - METROPOLITAN AREA TELEHEALTH 2 | Facility: TELEHEALTH | Age: 68
End: 2023-02-06

## 2023-04-06 ENCOUNTER — LAB OUTSIDE AN ENCOUNTER (OUTPATIENT)
Dept: URBAN - METROPOLITAN AREA TELEHEALTH 2 | Facility: TELEHEALTH | Age: 68
End: 2023-04-06

## 2023-04-11 ENCOUNTER — TELEPHONE ENCOUNTER (OUTPATIENT)
Dept: URBAN - METROPOLITAN AREA CLINIC 86 | Facility: CLINIC | Age: 68
End: 2023-04-11

## 2023-04-11 LAB
ACTIN (SMOOTH MUSCLE) ANTIBODY (IGG): 53
ALBUMIN/GLOBULIN RATIO: 1.9
ALBUMIN: 4.3
ALKALINE PHOSPHATASE: 55
ALT (SGPT): 17
AST (SGOT): 22
BILIRUBIN, DIRECT: 0.3
BILIRUBIN, INDIRECT: 1.1
BILIRUBIN, TOTAL: 1.4
GLOBULIN: 2.3
IMMUNOGLOBULIN A: 132
IMMUNOGLOBULIN G: 1031
IMMUNOGLOBULIN M: 69
PROTEIN, TOTAL: 6.6

## 2023-04-11 NOTE — HPI-TODAY'S VISIT:
Dear Tyler Iglesias, April 6 labs show ASMA actually remaining high at 53 from previous 45 and prior 56.  Desired is less than 20. Albumin 4.3 which is normal and total bilirubin 1.4 elevated with a direct 0.3 and that is compatible with your history of Gilbert's syndrome. As you recall the bilirubin level in [place] tend to be higher if you are fasting. Alkaline phosphatase was down 55 from 66 and prior 78 so good to see that.  AST was slightly lower at 22 and ALT slightly higher at 17.  Ideal ALT less than 35. IgG was normal at 1031 IgM normal at 69 and IgA normal 132 which is good to see.  That means that despite the ASMA being positive there is no sign of any immune activation that was seen.  It does show also how long it can take postexposure to a medication reaction for that immune marker that is triggered to go negative as we are not there yet. Will discuss this further at the visit. Dr. Magana

## 2023-04-12 ENCOUNTER — OFFICE VISIT (OUTPATIENT)
Dept: URBAN - METROPOLITAN AREA TELEHEALTH 2 | Facility: TELEHEALTH | Age: 68
End: 2023-04-12
Payer: MEDICARE

## 2023-04-12 VITALS — BODY MASS INDEX: 20.44 KG/M2 | HEIGHT: 71 IN | WEIGHT: 146 LBS

## 2023-04-12 DIAGNOSIS — E78.5 HYPERLIPIDEMIA: ICD-10-CM

## 2023-04-12 DIAGNOSIS — Z71.89 VACCINE COUNSELING: ICD-10-CM

## 2023-04-12 DIAGNOSIS — Z86.010 PERSONAL HISTORY OF COLONIC POLYPS: ICD-10-CM

## 2023-04-12 DIAGNOSIS — Z79.899 HIGH RISK MEDICATION USE: ICD-10-CM

## 2023-04-12 DIAGNOSIS — K71.9 DRUG INDUCED LIVER DISEASE: ICD-10-CM

## 2023-04-12 DIAGNOSIS — Q15.9 EYE ABNORMALITY: ICD-10-CM

## 2023-04-12 DIAGNOSIS — H40.9 GLAUCOMA: ICD-10-CM

## 2023-04-12 DIAGNOSIS — E80.4 GILBERT SYNDROME: ICD-10-CM

## 2023-04-12 DIAGNOSIS — R79.89 ABNORMAL LFTS: ICD-10-CM

## 2023-04-12 DIAGNOSIS — R03.0 ELEVATED BLOOD PRESSURE: ICD-10-CM

## 2023-04-12 PROCEDURE — 99214 OFFICE O/P EST MOD 30 MIN: CPT

## 2023-04-12 RX ORDER — ASCORBIC ACID 1000 MG
1 TABLET TABLET ORAL ONCE A DAY
Status: ACTIVE | COMMUNITY

## 2023-04-12 RX ORDER — TIMOLOL/DORZOLAMIDE/LATANOP/PF 0.5-2-.005
AS DIRECTED DROPS OPHTHALMIC (EYE)
Status: ACTIVE | COMMUNITY

## 2023-04-12 RX ORDER — ATORVASTATIN CALCIUM 10 MG/1
1 TABLET TABLET, FILM COATED ORAL ONCE A DAY
Status: ACTIVE | COMMUNITY

## 2023-04-12 NOTE — HPI-TODAY'S VISIT:
Patient is a 67-year-old male and last Jan 2023 and seeing Balbina Fall and we are asked to see regarding abnormal liver labs and possible immune issues.  A copy of the note will be sent to the referring provider.  April 6 labs show ASMA actually remaining high at 53 from previous 45 and prior 56.  Desired is less than 20. Albumin 4.3 which is normal and total bilirubin 1.4 elevated with a direct 0.3 and that is compatible with your history of Gilbert's syndrome. As you recall the bilirubin level in [place] tend to be higher if you are fasting. Alkaline phosphatase was down 55 from 66 and prior 78 so good to see that.  AST was slightly lower at 22 and ALT slightly higher at 17.  Ideal ALT less than 35. IgG was normal at 1031 IgM normal at 69 and IgA normal 132 which is good to see.    That means that despite the ASMA being positive there is no sign of any immune activation that was seen.  It does show also how long it can take postexposure to a medication reaction for that immune marker that is triggered to go negative as we are not there yet.  Pt says he has had a few things happen: 1. Started atorvastatin now for 1m. 2. Opth added latanoprost for glaucoma x 3m. 3. Had surgery for a finger and needed full anesthesia and some pain meds. Only pain med was at time of surgery. 4. Stepped on cat and bit on calf and had to be on 2 abx for 2 weeks and cefuroxine and metronidazole.  January 27 ultrasound shows the liver to have normal echogenicity and no discrete lesions. No gallstones seen. Common bile duct normal at 4.3 mm. Pancreas was normal where seen. Right kidney 10.4 cm with no hydronephrosis. Spleen 8.5 cm. Liver vessels patent as expected. Overall he liver had normal liver echogenicity and no suspicious lesions.  December 13 labs show IgG subclass 4 was normal at 79.2. Albumin was normal at 4.5 bilirubin was 1.2 which is normal with a direct bilirubin 0.3 suggesting that you have [place] syndrome which is a benign conjugation disorder bilirubin seen in about 10% of people.  It tends to run higher when you are fasting for labs and tends to be lower when you are not fasting for labs. Alkaline phosphatase is down to 66 AST stable at 23 and ALT stable at 16. Anti-smooth muscle antibody was lower at 45 from 56 and prior 66 in January of this year. It is still slightly up though. As you recall, we had the prior medicine that was suspected to be causing the liver reaction and its removal improved labs and the hope is for these immune markers to also normalize over time. They did not run the IgM total on you as they apparently had a  labcode change back in June when you were last seen and the old code for IgM was changed without us being advised/aware of to this new code for another test.  We will need to order the IGM test with your next labs. Hepatitis B surface antibody was positive at a strong titer of 469.  Hep A IgM was negative but the hep A total was positive indicating remotely developed immunity being present.  Many times when the hep A total is positive they do a reflex hep A IgM to confirm that it was not a recent antibody development.  He had hepatitis age 6.  Aug 12 u.s shows no gallstones.  Gallbladder wall has normal thickness. Common bile duct normal at 4 mm. Liver was homogeneous/even in echotexture.  No focal liver abnormality was noted. Image pancreas appeared unremarkable. Right kidney measured 10.4 cm with no hydronephrosis. Renal echogenicity was within normal limits. Spleen was normal at 8.4 cm. Liver vessels were patent. Good to note that the u.s did not show any changes.  July 1 labs show albumin 4.5 bilirubin normal at 0.7 and direct bilirubin 0.26.  Alkaline phosphatase 78 AST 23 down from 31.  ALT 17 down from 29.  Back in January AST 56 and ALT 55. Ideal alt less than 35.  As you recall we suspected the doxycycline as the potential cause of this liver issue.  Very happy to see the labs coming down this low off of it.  He had stopped that before the may visit.  Prior mentioned in 2021 he had lost some weight and said his primary did tests and his lfts were up and surprising for him.  Pt says said at time was on some medications and among them was doxycycline and a sleep aid and also atorvastatin and some herbal medications (sleep meds).   He started to remove them and the lfts were coming down and he says most of them in the ok range over time and he says that some that stayed up. GGTP down but the labs up enough that he referred him to Dr Monique and did asma and that was up.   Pt says that the enzymes down and restarted the doxycycline and it went back up and then stopped the doxycycline and dropped some but the asma did not drop and the ggtp did not drop.  Patient seen on January 20, 2022 by Dr. Monique. Patient has abnormal liver labs with labs to continue to show mild elevation at that time.  Patient had an elevated gamma GTP as well as an elevated ASMA.  He discussed the possibility of doing a biopsy. Patient also suspected to have Gilbert syndrome as well. Per Dr. Monique's note patient had a normal CT of the abdomen with regards to the liver. He had no symptoms.  He had been taking daily doxycycline for an ophthalmologic problem as well as n- acetylcysteine for the last 6 to 8 months.  Patient at that visit was noted to have a blood pressure 197/95 (very anxious and has white coat syndrome and says normally ok) and a BMI of 21.67. He says bp normally was good and tazley has days where bp is up 160/90 and has to take some medicine like lisinopril.  Certainly very possible that this could be related to the doxycycline triggering immunity issue. There are case reports that is triggering even overt autoimmune hepatitis.  Patient also did in February 2021 colonoscopy that showed 2 sessile polyps in the ascending colon that were small and removed with cold snare.  Multiple small large mouth diverticuli are seen in sigmoid and descending colon.  October 2021 a smooth muscle antibody of 43 INR 1.0 AST 28 ALT 20 alk phos 75 bilirubin 1.1 direct bilirubin 0.31 and albumin 4.4.  Sodium 141 potassium 4.8 BUN of 13 creatinine 0.86 glucose 99.  White blood cell count 5.5 hemoglobin 14.2 platelet count 265. That was the first time off the meds and those were the normal labs.  January 2022 gamma GTP was elevated at 172 smooth muscle antibody 66 INR 1.1 white cell count 7.7 hemoglobin 14.5 plate count 238 and AST 56 ALT 55 bilirubin 1.2 direct 0.39 alk phos 82 sodium 141 potassium 4.5 BUN of 13 creatinine 0.94 glucose of 88. That was the reexposure time and back on the doxycycline at the time.  February 25, 2022 AST 31 ALT 29 total bili 1.6 direct bilirubin 0.41 albumin 4.6 and smooth muscle antibody 56.  Gamma GTP 66. That was off the meds.  April 2022 did redo labs with primary md and says that the asma and ggtp up and others marginal.  Perlivertox: Doxycycline has been associated with rare instances of hepatic injury, generally arising within 1 to 2 weeks of starting therapy, sometimes with a history of previous administration of the agent without injury. The pattern of injury ranges from hepatocellular to cholestatic and is probably most commonly mixed. The onset is often abrupt and can be accompanied by signs of hypersensitivity, such as fever, rash and eosinophilia (DRESS syndrome). Recovery is usually rapid and usually complete within 4 to 6 weeks. However, instances of severe and prolonged cholestatic liver injury have been reported with oral doxycycline. The autoimmune-like hepatitis that has been described with minocycline has not been linked to doxycycline, despite similarities in chemical structure and similar indications and uses, perhaps because it is used less frequency in a low dose, long term regimen. High dose intravenous doxycycline can cause acute fatty liver typical of that caused by intravenous tetracycline, particularly in susceptible patients such as pregnant women. This type of injury is, however, quite rare. Nevertheless, for these reasons, the duration and dose of parenteral doxycycline therapy should be minimized. Likelihood score: B (highly likely but rare cause of clinically apparent liver injury).  The immune markers may take 3-6 or a 12 months to go back to normal. Sometimes with a rechallenge can worsen the picture.  NAC used by him. It is not toxic to liver. Uses for anxiety. Used custodial in trials for tx liver injury.  Has been on atorvastatin and wants to take a low dose of this daily and would need to look at taking a low dose of this and with close labs. Atorvastatin can also trigger auotimmunity.  Plan: 1.	Need to watch with atorvastatin again in 2m and labs and due for u.s in July. 2.     Pt will keep track of meds and changes. Maybe praluent and if so watch for that but seems lower risk than the statin per liver tox. 3.     Monitor course. 4.     May take apap less than 2 gram per day.   Stressed to pt the need for social distancing and strict handwashing and wearing a mask and to follow any other new or added CDC recommendations as this is an evolving target.  Duration of the visit was 36 minutes with 10 minutes of chart prep and 26 minutes by hector for this TeleMed visit with time reviewing their prior and recent records and labs and discussing their current status and future plans for care for the patient.

## 2023-06-01 ENCOUNTER — LAB OUTSIDE AN ENCOUNTER (OUTPATIENT)
Dept: URBAN - METROPOLITAN AREA TELEHEALTH 2 | Facility: TELEHEALTH | Age: 68
End: 2023-06-01

## 2023-06-27 ENCOUNTER — OFFICE VISIT (OUTPATIENT)
Dept: URBAN - METROPOLITAN AREA CLINIC 91 | Facility: CLINIC | Age: 68
End: 2023-06-27

## 2023-06-28 ENCOUNTER — TELEPHONE ENCOUNTER (OUTPATIENT)
Dept: URBAN - METROPOLITAN AREA CLINIC 86 | Facility: CLINIC | Age: 68
End: 2023-06-28

## 2023-06-28 LAB
A/G RATIO: 1.8
ABSOLUTE BASOPHILS: 48
ABSOLUTE EOSINOPHILS: 90
ABSOLUTE LYMPHOCYTES: 1206
ABSOLUTE MONOCYTES: 408
ABSOLUTE NEUTROPHILS: 4248
ALBUMIN: 4.4
ALKALINE PHOSPHATASE: 64
ALT (SGPT): 20
AST (SGOT): 22
BASOPHILS: 0.8
BILIRUBIN, DIRECT: 0.3
BILIRUBIN, TOTAL: 1.5
BUN/CREATININE RATIO: (no result)
BUN: 12
CALCIUM: 9.4
CARBON DIOXIDE, TOTAL: 25
CHLORIDE: 107
CREATININE: 0.82
EGFR: 96
EOSINOPHILS: 1.5
GLOBULIN, TOTAL: 2.4
GLUCOSE: 80
HEMATOCRIT: 40.9
HEMOGLOBIN: 13.7
LYMPHOCYTES: 20.1
MCH: 31.3
MCHC: 33.5
MCV: 93.4
MONOCYTES: 6.8
MPV: 9.6
NEUTROPHILS: 70.8
PLATELET COUNT: 214
POTASSIUM: 4.4
PROTEIN, TOTAL: 6.8
RDW: 12.1
RED BLOOD CELL COUNT: 4.38
SODIUM: 140
WHITE BLOOD CELL COUNT: 6

## 2023-06-28 NOTE — HPI-TODAY'S VISIT:
Dear Tyler Iglesias, June 27 labs show direct bilirubin 0.3 which is slightly off with total bilirubin 1.5 which means that you have an indirect of 1.2 and that will be consistent with Gilbert's syndrome which is a benign conjugation disorder bilirubin that is seen in about 10% of people. Glucose was 80 BUN of 12 creatinine 0.82 sodium 140 potassium 4.4 calcium 9.4 albumin 4.4. Alk phos normal at 64 AST 22 and ALT 20 with ideal ALT less than 35. WBC 6 hemoglobin 13.7 platelet count 214 MCV 93.4 neutrophils 40-48 lymphocytes 1206. Dr. Magana

## 2023-06-30 ENCOUNTER — OFFICE VISIT (OUTPATIENT)
Dept: URBAN - NONMETROPOLITAN AREA CLINIC 1 | Facility: CLINIC | Age: 68
End: 2023-06-30

## 2023-06-30 ENCOUNTER — TELEPHONE ENCOUNTER (OUTPATIENT)
Dept: URBAN - METROPOLITAN AREA MEDICAL CENTER 28 | Facility: MEDICAL CENTER | Age: 68
End: 2023-06-30

## 2023-07-03 ENCOUNTER — LAB OUTSIDE AN ENCOUNTER (OUTPATIENT)
Dept: URBAN - METROPOLITAN AREA TELEHEALTH 2 | Facility: TELEHEALTH | Age: 68
End: 2023-07-03

## 2023-07-05 ENCOUNTER — OFFICE VISIT (OUTPATIENT)
Dept: URBAN - METROPOLITAN AREA TELEHEALTH 2 | Facility: TELEHEALTH | Age: 68
End: 2023-07-05
Payer: MEDICARE

## 2023-07-05 VITALS — BODY MASS INDEX: 20.44 KG/M2 | WEIGHT: 146 LBS | HEIGHT: 71 IN

## 2023-07-05 DIAGNOSIS — Z86.010 PERSONAL HISTORY OF COLONIC POLYPS: ICD-10-CM

## 2023-07-05 DIAGNOSIS — R79.89 ABNORMAL LFTS: ICD-10-CM

## 2023-07-05 DIAGNOSIS — K71.9 DRUG INDUCED LIVER DISEASE: ICD-10-CM

## 2023-07-05 DIAGNOSIS — E80.4 GILBERT SYNDROME: ICD-10-CM

## 2023-07-05 PROCEDURE — 99214 OFFICE O/P EST MOD 30 MIN: CPT

## 2023-07-05 RX ORDER — ASCORBIC ACID 1000 MG
1 TABLET TABLET ORAL ONCE A DAY
Status: ACTIVE | COMMUNITY

## 2023-07-05 RX ORDER — ATORVASTATIN CALCIUM 10 MG/1
1 TABLET TABLET, FILM COATED ORAL ONCE A DAY
Status: ACTIVE | COMMUNITY

## 2023-07-05 RX ORDER — TIMOLOL/DORZOLAMIDE/LATANOP/PF 0.5-2-.005
AS DIRECTED DROPS OPHTHALMIC (EYE)
Status: ACTIVE | COMMUNITY

## 2023-07-05 NOTE — HPI-TODAY'S VISIT:
Patient is a 67-year-old male and last April 2023 and seeing Balbina Fall and we are asked to see regarding abnormal liver labs and possible immune issues.  A copy of the note will be sent to the referring provider.  June 27 labs show direct bilirubin 0.3 which is slightly off with total bilirubin 1.5 which means that you have an indirect of 1.2 and that will be consistent with Gilbert's syndrome which is a benign conjugation disorder bilirubin that is seen in about 10% of people. Glucose was 80 BUN of 12 creatinine 0.82 sodium 140 potassium 4.4 calcium 9.4 albumin 4.4. Alk phos normal at 64 AST 22 and ALT 20 with ideal ALT less than 35. WBC 6 hemoglobin 13.7 platelet count 214 MCV 93.4 neutrophils 4048 lymphocytes 1206.   April 6 labs show ASMA actually remaining high at 53 from previous 45 and prior 56.  Desired is less than 20. Albumin 4.3 which is normal and total bilirubin 1.4 elevated with a direct 0.3 and that is compatible with your history of Gilbert's syndrome. As you recall the bilirubin level in [place] tend to be higher if you are fasting. Alkaline phosphatase was down 55 from 66 and prior 78 so good to see that.  AST was slightly lower at 22 and ALT slightly higher at 17.  Ideal ALT less than 35. IgG was normal at 1031 IgM normal at 69 and IgA normal 132 which is good to see.    That means that despite the ASMA being positive there is no sign of any immune activation that was seen.  It does show also how long it can take postexposure to a medication reaction for that immune marker that is triggered to go negative as we are not there yet.  Pt says he has had a few things happen at the last visit: 1. Started atorvastatin now March 2023 and still on this. 2. Opth added latanoprost for glaucoma x 3m. 3. Had surgery for a finger and needed full anesthesia and some pain meds. Only pain med was at time of surgery. 4. Stepped on cat and bit on calf and had to be on 2 abx for 2 weeks and cefuroxine and metronidazole. He healed well.  U.s is being rescheduled.  January 27 ultrasound shows the liver to have normal echogenicity and no discrete lesions. No gallstones seen. Common bile duct normal at 4.3 mm. Pancreas was normal where seen. Right kidney 10.4 cm with no hydronephrosis. Spleen 8.5 cm. Liver vessels patent as expected. Overall he liver had normal liver echogenicity and no suspicious lesions.  December 13 2022  labs show IgG subclass 4 was normal at 79.2. Albumin was normal at 4.5 bilirubin was 1.2 which is normal with a direct bilirubin 0.3 suggesting that you have [place] syndrome which is a benign conjugation disorder bilirubin seen in about 10% of people.  It tends to run higher when you are fasting for labs and tends to be lower when you are not fasting for labs. Alkaline phosphatase is down to 66 AST stable at 23 and ALT stable at 16. Anti-smooth muscle antibody was lower at 45 from 56 and prior 66 in January of this year. It is still slightly up though. As you recall, we had the prior medicine that was suspected to be causing the liver reaction and its removal improved labs and the hope is for these immune markers to also normalize over time. They did not run the IgM total on you as they apparently had a  labcode change back in June when you were last seen and the old code for IgM was changed without us being advised/aware of to this new code for another test.  We will need to order the IGM test with your next labs. Hepatitis B surface antibody was positive at a strong titer of 469.  Hep A IgM was negative but the hep A total was positive indicating remotely developed immunity being present.  Many times when the hep A total is positive they do a reflex hep A IgM to confirm that it was not a recent antibody development.  He had hepatitis age 6.  Aug 12 u.s shows no gallstones.  Gallbladder wall has normal thickness. Common bile duct normal at 4 mm. Liver was homogeneous/even in echotexture.  No focal liver abnormality was noted. Image pancreas appeared unremarkable. Right kidney measured 10.4 cm with no hydronephrosis. Renal echogenicity was within normal limits. Spleen was normal at 8.4 cm. Liver vessels were patent. Good to note that the u.s did not show any changes.  July 1 labs show albumin 4.5 bilirubin normal at 0.7 and direct bilirubin 0.26.  Alkaline phosphatase 78 AST 23 down from 31.  ALT 17 down from 29.  Back in January AST 56 and ALT 55. Ideal alt less than 35.  As you recall we suspected the doxycycline as the potential cause of this liver issue.  Very happy to see the labs coming down this low off of it.  He had stopped that before the may visit.  Prior mentioned in 2021 he had lost some weight and said his primary did tests and his lfts were up and surprising for him.  Pt says said at time was on some medications and among them was doxycycline and a sleep aid and also atorvastatin and some herbal medications (sleep meds).   He started to remove them and the lfts were coming down and he says most of them in the ok range over time and he says that some that stayed up. GGTP down but the labs up enough that he referred him to Dr Monique and did asma and that was up.   Pt says that the enzymes down and restarted the doxycycline and it went back up and then stopped the doxycycline and dropped some but the asma did not drop and the ggtp did not drop.  Patient seen on January 20, 2022 by Dr. Monique. Patient has abnormal liver labs with labs to continue to show mild elevation at that time.  Patient had an elevated gamma GTP as well as an elevated ASMA.  He discussed the possibility of doing a biopsy. Patient also suspected to have Gilbert syndrome as well. Per Dr. Monique's note patient had a normal CT of the abdomen with regards to the liver. He had no symptoms.  He had been taking daily doxycycline for an ophthalmologic problem as well as n- acetylcysteine for the last 6 to 8 months.  Patient at that visit was noted to have a blood pressure 197/95 (very anxious and has white coat syndrome and says normally ok) and a BMI of 21.67. He says bp normally was good and tazley has days where bp is up 160/90 and has to take some medicine like lisinopril.  Certainly very possible that this could be related to the doxycycline triggering immunity issue. There are case reports that is triggering even overt autoimmune hepatitis.  Patient also did in February 2021 colonoscopy that showed 2 sessile polyps in the ascending colon that were small and removed with cold snare.  Multiple small large mouth diverticuli are seen in sigmoid and descending colon.  October 2021 a smooth muscle antibody of 43 INR 1.0 AST 28 ALT 20 alk phos 75 bilirubin 1.1 direct bilirubin 0.31 and albumin 4.4.  Sodium 141 potassium 4.8 BUN of 13 creatinine 0.86 glucose 99.  White blood cell count 5.5 hemoglobin 14.2 platelet count 265. That was the first time off the meds and those were the normal labs.  January 2022 gamma GTP was elevated at 172 smooth muscle antibody 66 INR 1.1 white cell count 7.7 hemoglobin 14.5 plate count 238 and AST 56 ALT 55 bilirubin 1.2 direct 0.39 alk phos 82 sodium 141 potassium 4.5 BUN of 13 creatinine 0.94 glucose of 88. That was the reexposure time and back on the doxycycline at the time.  February 25, 2022 AST 31 ALT 29 total bili 1.6 direct bilirubin 0.41 albumin 4.6 and smooth muscle antibody 56.  Gamma GTP 66. That was off the meds.  April 2022 did redo labs with primary md and says that the asma and ggtp up and others marginal.  Perlivertox: Doxycycline has been associated with rare instances of hepatic injury, generally arising within 1 to 2 weeks of starting therapy, sometimes with a history of previous administration of the agent without injury. The pattern of injury ranges from hepatocellular to cholestatic and is probably most commonly mixed. The onset is often abrupt and can be accompanied by signs of hypersensitivity, such as fever, rash and eosinophilia (DRESS syndrome). Recovery is usually rapid and usually complete within 4 to 6 weeks. However, instances of severe and prolonged cholestatic liver injury have been reported with oral doxycycline. The autoimmune-like hepatitis that has been described with minocycline has not been linked to doxycycline, despite similarities in chemical structure and similar indications and uses, perhaps because it is used less frequency in a low dose, long term regimen. High dose intravenous doxycycline can cause acute fatty liver typical of that caused by intravenous tetracycline, particularly in susceptible patients such as pregnant women. This type of injury is, however, quite rare. Nevertheless, for these reasons, the duration and dose of parenteral doxycycline therapy should be minimized. Likelihood score: B (highly likely but rare cause of clinically apparent liver injury).  The immune markers may take 3-6 or a 12 months to go back to normal. Sometimes with a rechallenge can worsen the picture.  NAC used by him. It is not toxic to liver. Uses for anxiety. Used [employer] in trials for tx liver injury.  Has been on atorvastatin and wants to take a low dose of this daily and would need to look at taking a low dose of this and with close labs. Atorvastatin can also trigger auotimmunity.  Plan: 1.	Pt is staying on atorvastatin and rare chance to activate the issue.  2.     He is to do the u.s to rebook. 3.     Asma next time and the labs. 4.     Telemed in .   Duration of the visit was 31 minutes with 10 minutes of chart prep and 21 minutes by hector for this TeleMed visit with time reviewing their prior and recent records and labs and discussing their current status and future plans for care for the patient.

## 2023-08-01 ENCOUNTER — LAB OUTSIDE AN ENCOUNTER (OUTPATIENT)
Dept: URBAN - METROPOLITAN AREA TELEHEALTH 2 | Facility: TELEHEALTH | Age: 68
End: 2023-08-01

## 2023-09-25 ENCOUNTER — LAB OUTSIDE AN ENCOUNTER (OUTPATIENT)
Dept: URBAN - METROPOLITAN AREA TELEHEALTH 2 | Facility: TELEHEALTH | Age: 68
End: 2023-09-25

## 2023-10-05 ENCOUNTER — OFFICE VISIT (OUTPATIENT)
Dept: URBAN - METROPOLITAN AREA TELEHEALTH 2 | Facility: TELEHEALTH | Age: 68
End: 2023-10-05
Payer: MEDICARE

## 2023-10-05 ENCOUNTER — LAB OUTSIDE AN ENCOUNTER (OUTPATIENT)
Dept: URBAN - METROPOLITAN AREA TELEHEALTH 2 | Facility: TELEHEALTH | Age: 68
End: 2023-10-05

## 2023-10-05 VITALS — WEIGHT: 146 LBS | BODY MASS INDEX: 20.44 KG/M2 | HEIGHT: 71 IN

## 2023-10-05 DIAGNOSIS — E78.5 HYPERLIPIDEMIA: ICD-10-CM

## 2023-10-05 DIAGNOSIS — E80.4 GILBERT SYNDROME: ICD-10-CM

## 2023-10-05 DIAGNOSIS — K71.9 DRUG INDUCED LIVER DISEASE: ICD-10-CM

## 2023-10-05 PROCEDURE — 99214 OFFICE O/P EST MOD 30 MIN: CPT

## 2023-10-05 RX ORDER — LATANOPROST 50 UG/ML
DROP TO BOTH EYES SOLUTION/ DROPS OPHTHALMIC ONCE A DAY
Status: ACTIVE | COMMUNITY

## 2023-10-05 RX ORDER — TIMOLOL/BRIMONIDIN/DORZOLAM/PF 0.5-.15-2%
AS DIRECTED DROPS OPHTHALMIC (EYE)
Status: ACTIVE | COMMUNITY

## 2023-10-05 RX ORDER — METRONIDAZOLE 10 MG/G
1 APPLICATION GEL TOPICAL
Status: ACTIVE | COMMUNITY

## 2023-10-05 RX ORDER — CEFDINIR 300 MG/1
AS DIRECTED CAPSULE ORAL TWICE A DAY
Status: ACTIVE | COMMUNITY

## 2023-10-05 RX ORDER — ATORVASTATIN CALCIUM 10 MG/1
1 TABLET TABLET, FILM COATED ORAL ONCE A DAY
Status: ACTIVE | COMMUNITY

## 2023-10-05 RX ORDER — ASCORBIC ACID 1000 MG
1 TABLET TABLET ORAL ONCE A DAY
Status: ACTIVE | COMMUNITY

## 2023-10-05 NOTE — HPI-TODAY'S VISIT:
Patient is a 68-year-old male and last July 2023 and seeing Balbina Fall and we are asked to see regarding abnormal liver labs and possible immune issues.  A copy of the note will be sent to the referring provider.  He was out of town and did labs 2 days ago and did at [facility] and not back yet.  U.s was cancelled and not rebooked.  June 27 labs show direct bilirubin 0.3 which is slightly off with total bilirubin 1.5 which means that you have an indirect of 1.2 and that will be consistent with Gilbert's syndrome which is a benign conjugation disorder bilirubin that is seen in about 10% of people. Glucose was 80 BUN of 12 creatinine 0.82 sodium 140 potassium 4.4 calcium 9.4 albumin 4.4. Alk phos normal at 64 AST 22 and ALT 20 with ideal ALT less than 35. WBC 6 hemoglobin 13.7 platelet count 214 MCV 93.4 neutrophils 4048 lymphocytes 1206.   April 6 labs show ASMA actually remaining high at 53 from previous 45 and prior 56.  Desired is less than 20. Albumin 4.3 which is normal and total bilirubin 1.4 elevated with a direct 0.3 and that is compatible with your history of Gilbert's syndrome. As you recall the bilirubin level in [place] tend to be higher if you are fasting. Alkaline phosphatase was down 55 from 66 and prior 78 so good to see that.  AST was slightly lower at 22 and ALT slightly higher at 17.  Ideal ALT less than 35. IgG was normal at 1031 IgM normal at 69 and IgA normal 132 which is good to see.    That means that despite the ASMA being positive there is no sign of any immune activation that was seen.  It does show also how long it can take postexposure to a medication reaction for that immune marker that is triggered to go negative as we are not there yet. He is still on the atorvastatin low dose 10mg po qd.  Pt says he has had a few things happen at the last visit: 1. Started atorvastatin now March 2023 2. Opth added latanoprost for glaucoma x 3m. 3. Had surgery for a finger and needed full anesthesia and some pain meds. Only pain med was at time of surgery. 4. Stepped on cat and bit on calf and had to be on 2 abx for 2 weeks and cefuroxine and metronidazole. He healed well.  January 27 ultrasound shows the liver to have normal echogenicity and no discrete lesions. No gallstones seen. Common bile duct normal at 4.3 mm. Pancreas was normal where seen. Right kidney 10.4 cm with no hydronephrosis. Spleen 8.5 cm. Liver vessels patent as expected. Overall he liver had normal liver echogenicity and no suspicious lesions.  December 13 2022  labs show IgG subclass 4 was normal at 79.2. Albumin was normal at 4.5 bilirubin was 1.2 which is normal with a direct bilirubin 0.3 suggesting that you have [place] syndrome which is a benign conjugation disorder bilirubin seen in about 10% of people.  It tends to run higher when you are fasting for labs and tends to be lower when you are not fasting for labs. Alkaline phosphatase is down to 66 AST stable at 23 and ALT stable at 16. Anti-smooth muscle antibody was lower at 45 from 56 and prior 66 in January of this year. It is still slightly up though. As you recall, we had the prior medicine that was suspected to be causing the liver reaction and its removal improved labs and the hope is for these immune markers to also normalize over time. They did not run the IgM total on you as they apparently had a  labcode change back in June when you were last seen and the old code for IgM was changed without us being advised/aware of to this new code for another test.  We will need to order the IGM test with your next labs. Hepatitis B surface antibody was positive at a strong titer of 469.  Hep A IgM was negative but the hep A total was positive indicating remotely developed immunity being present.  Many times when the hep A total is positive they do a reflex hep A IgM to confirm that it was not a recent antibody development.  He had hepatitis age 6.  Aug 12 u.s shows no gallstones.  Gallbladder wall has normal thickness. Common bile duct normal at 4 mm. Liver was homogeneous/even in echotexture.  No focal liver abnormality was noted. Image pancreas appeared unremarkable. Right kidney measured 10.4 cm with no hydronephrosis. Renal echogenicity was within normal limits. Spleen was normal at 8.4 cm. Liver vessels were patent. Good to note that the u.s did not show any changes.  July 1 labs show albumin 4.5 bilirubin normal at 0.7 and direct bilirubin 0.26.  Alkaline phosphatase 78 AST 23 down from 31.  ALT 17 down from 29.  Back in January AST 56 and ALT 55. Ideal alt less than 35.  As you recall we suspected the doxycycline as the potential cause of this liver issue.  Very happy to see the labs coming down this low off of it.  He had stopped that before the may visit.  Prior mentioned in 2021 he had lost some weight and said his primary did tests and his lfts were up and surprising for him.  Pt says said at time was on some medications and among them was doxycycline and a sleep aid and also atorvastatin and some herbal medications (sleep meds).   He started to remove them and the lfts were coming down and he says most of them in the ok range over time and he says that some that stayed up. GGTP down but the labs up enough that he referred him to Dr Monique and did asma and that was up.   Pt says that the enzymes down and restarted the doxycycline and it went back up and then stopped the doxycycline and dropped some but the asma did not drop and the ggtp did not drop.  Patient seen on January 20, 2022 by Dr. Monique. Patient has abnormal liver labs with labs to continue to show mild elevation at that time.  Patient had an elevated gamma GTP as well as an elevated ASMA.  He discussed the possibility of doing a biopsy. Patient also suspected to have Gilbert syndrome as well. Per Dr. Monique's note patient had a normal CT of the abdomen with regards to the liver. He had no symptoms.  He had been taking daily doxycycline for an ophthalmologic problem as well as n- acetylcysteine for the last 6 to 8 months.  Patient at that visit was noted to have a blood pressure 197/95 (very anxious and has white coat syndrome and says normally ok) and a BMI of 21.67. He says bp normally was good and tazley has days where bp is up 160/90 and has to take some medicine like lisinopril.  Certainly very possible that this could be related to the doxycycline triggering immunity issue. There are case reports that is triggering even overt autoimmune hepatitis.  Patient also did in February 2021 colonoscopy that showed 2 sessile polyps in the ascending colon that were small and removed with cold snare.  Multiple small large mouth diverticuli are seen in sigmoid and descending colon.  October 2021 a smooth muscle antibody of 43 INR 1.0 AST 28 ALT 20 alk phos 75 bilirubin 1.1 direct bilirubin 0.31 and albumin 4.4.  Sodium 141 potassium 4.8 BUN of 13 creatinine 0.86 glucose 99.  White blood cell count 5.5 hemoglobin 14.2 platelet count 265. That was the first time off the meds and those were the normal labs.  January 2022 gamma GTP was elevated at 172 smooth muscle antibody 66 INR 1.1 white cell count 7.7 hemoglobin 14.5 plate count 238 and AST 56 ALT 55 bilirubin 1.2 direct 0.39 alk phos 82 sodium 141 potassium 4.5 BUN of 13 creatinine 0.94 glucose of 88. That was the reexposure time and back on the doxycycline at the time.  February 25, 2022 AST 31 ALT 29 total bili 1.6 direct bilirubin 0.41 albumin 4.6 and smooth muscle antibody 56.  Gamma GTP 66. That was off the meds.  April 2022 did redo labs with primary md and says that the asma and ggtp up and others marginal.  Perlivertox: Doxycycline has been associated with rare instances of hepatic injury, generally arising within 1 to 2 weeks of starting therapy, sometimes with a history of previous administration of the agent without injury. The pattern of injury ranges from hepatocellular to cholestatic and is probably most commonly mixed. The onset is often abrupt and can be accompanied by signs of hypersensitivity, such as fever, rash and eosinophilia (DRESS syndrome). Recovery is usually rapid and usually complete within 4 to 6 weeks. However, instances of severe and prolonged cholestatic liver injury have been reported with oral doxycycline. The autoimmune-like hepatitis that has been described with minocycline has not been linked to doxycycline, despite similarities in chemical structure and similar indications and uses, perhaps because it is used less frequency in a low dose, long term regimen. High dose intravenous doxycycline can cause acute fatty liver typical of that caused by intravenous tetracycline, particularly in susceptible patients such as pregnant women. This type of injury is, however, quite rare. Nevertheless, for these reasons, the duration and dose of parenteral doxycycline therapy should be minimized. Likelihood score: B (highly likely but rare cause of clinically apparent liver injury).  The immune markers may take 3-6 or a 12 months to go back to normal. Sometimes with a rechallenge can worsen the picture.  NAC used by him. It is not toxic to liver. Uses for anxiety. Used [facility] in trials for tx liver injury.  Has been on atorvastatin and wants to take a low dose of this daily and would need to look at taking a low dose of this and with close labs. Atorvastatin can also trigger auotimmunity.  Plan: 1.	Pt is staying on atorvastatin and again there is a rare chance to activate the immune issue.  2.     He is to do the u.s locally. 3.     Asma pending. 4.     Telemed in .   Duration of the visit was30 minutes with 10 minutes of chart prep and 20 minutes from 145 pm to 205 pm by lorie as healow clock off due to internet and computer fluxing for this TeleMed visit with time reviewing their prior and recent records and labs and discussing their current status and future plans for care for the patient.

## 2023-10-06 ENCOUNTER — TELEPHONE ENCOUNTER (OUTPATIENT)
Dept: URBAN - METROPOLITAN AREA CLINIC 86 | Facility: CLINIC | Age: 68
End: 2023-10-06

## 2023-10-06 ENCOUNTER — OFFICE VISIT (OUTPATIENT)
Dept: URBAN - NONMETROPOLITAN AREA CLINIC 1 | Facility: CLINIC | Age: 68
End: 2023-10-06
Payer: MEDICARE

## 2023-10-06 DIAGNOSIS — R74.8 ABNORMAL LIVER ENZYMES: ICD-10-CM

## 2023-10-06 LAB
ACTIN (SMOOTH MUSCLE) ANTIBODY (IGG): 53
ALBUMIN/GLOBULIN RATIO: 2.2
ALBUMIN: 4.4
ALKALINE PHOSPHATASE: 68
ALT (SGPT): 23
AST (SGOT): 34
BILIRUBIN, DIRECT: 0.4
BILIRUBIN, INDIRECT: 1.1
BILIRUBIN, TOTAL: 1.5
GLOBULIN: 2
PROTEIN, TOTAL: 6.4

## 2023-10-06 PROCEDURE — 76705 ECHO EXAM OF ABDOMEN: CPT

## 2023-10-06 PROCEDURE — 93975 VASCULAR STUDY: CPT

## 2023-10-06 NOTE — HPI-TODAY'S VISIT:
Dear Tyler Iglesias,  Your Oct 3 labs shows that the smooth muscle antibody remains elevated at 53 and was also elevated at 53 in April and in December had been 45.  Need to watch that trend. Despite this, your liver labs are stable with the albumin normal at 4.4 and bilirubin slightly up at 1.5 with a direct 0.4 which would be very suspicious for Gilbert's syndrome which is that benign conjugation disorder.  It tends to be higher when you are fasting and lower when you are not. Your alk phos was normal at 68 AST 34 ALT 23 with ideal ALT less than 35.  I would point out that back in April your AST had been 22 and ALT of 17 so while within the low normal range and ideal still they are slightly higher and that  is worth watching. I would ask you to repeat the labs again before your January visit to see what that shows. From my notes the main concern that we have for these labs to be up possibly a little bit was that you were taking the cefdinir which is an antibiotic and in its not uncommon for all antibiotics to cause some slight fluctuations in the labs and most people do not perceive it because they do not have a recent baseline to compare to but for you we do have a baseline and noticed this. It is very subtle and not high enough to warrant any medicine change at this time. Dr. Magana

## 2023-10-09 ENCOUNTER — TELEPHONE ENCOUNTER (OUTPATIENT)
Dept: URBAN - METROPOLITAN AREA CLINIC 86 | Facility: CLINIC | Age: 68
End: 2023-10-09

## 2023-10-09 NOTE — HPI-TODAY'S VISIT:
Muna Iglesias, Oct 6 ultrasound shows liver to be normal in contour and top normal in echogenicity but with no suspicious lesions. No gallbladder wall thickening or stone seen. Common bile duct normal at 4.8 mm. Right kidney 10.7 cm with no hydronephrosis. Spleen normal at 8.7 cm. Pancreas tail was predominantly obscured by gas but the imagef pancreas portions otherwise were grossly unremarkable. Liver vessels were patent as expected. Splenic vessels were also patent as expected. Overall they saw no signs of any advanced fibrosis or suspicious lesions and liver had patent vessels. I compared this to your January 27 ultrasound and that showed the liver to have normal echogenicity and no discrete lesions at that timeframe.We will need to repeat this ultrasound again in 6 months. Dr Magana

## 2024-01-05 ENCOUNTER — LAB OUTSIDE AN ENCOUNTER (OUTPATIENT)
Dept: URBAN - METROPOLITAN AREA TELEHEALTH 2 | Facility: TELEHEALTH | Age: 69
End: 2024-01-05

## 2024-01-08 ENCOUNTER — TELEPHONE ENCOUNTER (OUTPATIENT)
Dept: URBAN - METROPOLITAN AREA CLINIC 86 | Facility: CLINIC | Age: 69
End: 2024-01-08

## 2024-01-08 ENCOUNTER — LAB OUTSIDE AN ENCOUNTER (OUTPATIENT)
Dept: URBAN - METROPOLITAN AREA CLINIC 86 | Facility: CLINIC | Age: 69
End: 2024-01-08

## 2024-01-08 ENCOUNTER — OFFICE VISIT (OUTPATIENT)
Dept: URBAN - METROPOLITAN AREA TELEHEALTH 2 | Facility: TELEHEALTH | Age: 69
End: 2024-01-08
Payer: MEDICARE

## 2024-01-08 ENCOUNTER — DASHBOARD ENCOUNTERS (OUTPATIENT)
Age: 69
End: 2024-01-08

## 2024-01-08 DIAGNOSIS — R79.89 ABNORMAL LFTS: ICD-10-CM

## 2024-01-08 DIAGNOSIS — Z79.899 HIGH RISK MEDICATION USE: ICD-10-CM

## 2024-01-08 DIAGNOSIS — K71.9 DRUG INDUCED LIVER DISEASE: ICD-10-CM

## 2024-01-08 DIAGNOSIS — E80.4 GILBERT SYNDROME: ICD-10-CM

## 2024-01-08 PROCEDURE — 99214 OFFICE O/P EST MOD 30 MIN: CPT | Performed by: PHYSICIAN ASSISTANT

## 2024-01-08 RX ORDER — TIMOLOL/BRIMONIDIN/DORZOLAM/PF 0.5-.15-2%
AS DIRECTED DROPS OPHTHALMIC (EYE)
Status: ACTIVE | COMMUNITY

## 2024-01-08 RX ORDER — LATANOPROST 50 UG/ML
DROP TO BOTH EYES SOLUTION/ DROPS OPHTHALMIC ONCE A DAY
Status: ACTIVE | COMMUNITY

## 2024-01-08 RX ORDER — ATORVASTATIN CALCIUM 10 MG/1
1 TABLET TABLET, FILM COATED ORAL ONCE A DAY
Status: ACTIVE | COMMUNITY

## 2024-01-08 RX ORDER — LISINOPRIL 10 MG/1
1 TABLET TABLET ORAL ONCE A DAY
Status: ACTIVE | COMMUNITY

## 2024-01-08 RX ORDER — ASCORBIC ACID 1000 MG
1 TABLET TABLET ORAL ONCE A DAY
Status: ACTIVE | COMMUNITY

## 2024-01-08 RX ORDER — METRONIDAZOLE 10 MG/G
1 APPLICATION GEL TOPICAL
Status: ACTIVE | COMMUNITY

## 2024-01-08 NOTE — HPI-TODAY'S VISIT:
Patient is a 68-year-old male and last July 2023 and seeing Balbina Fall and we are asked to see regarding abnormal liver labs and possible immune issues.  A copy of the note will be sent to the referring provider.  He was out of town and did labs 2 days ago and did at [facility] and not back yet.  1/8/24 eugene Iglesias, Oct 6 ultrasound shows liver to be normal in contour and top normal in echogenicity but with no suspicious lesions. No gallbladder wall thickening or stone seen. Common bile duct normal at 4.8 mm. Right kidney 10.7 cm with no hydronephrosis. Spleen normal at 8.7 cm. Pancreas tail was predominantly obscured by gas but the imagef pancreas portions otherwise were grossly unremarkable. Liver vessels were patent as expected. Splenic vessels were also patent as expected. Overall they saw no signs of any advanced fibrosis or suspicious lesions and liver had patent vessels. I compared this to your January 27 ultrasound and that showed the liver to have normal echogenicity and no discrete lesions at that timeframe.We will need to repeat this ultrasound again in 6 months. Dr Magana Dear Tyler Iglesias,  Your Oct 3 labs shows that the smooth muscle antibody remains elevated at 53 and was also elevated at 53 in April and in December had been 45. Need to watch that trend. Despite this, your liver labs are stable with the albumin normal at 4.4 and bilirubin slightly up at 1.5 with a direct 0.4 which would be very suspicious for Gilbert's syndrome which is that benign conjugation disorder. It tends to be higher when you are fasting and lower when you are not. Your alk phos was normal at 68 AST 34 ALT 23 with ideal ALT less than 35. I would point out that back in April your AST had been 22 and ALT of 17 so while within the low normal range and ideal still they are slightly higher and that is worth watching. I would ask you to repeat the labs again before your January visit to see what that shows. From my notes the main concern that we have for these labs to be up possibly a little bit was that you were taking the cefdinir which is an antibiotic and in its not uncommon for all antibiotics to cause some slight fluctuations in the labs and most people do not perceive it because they do not have a recent baseline to compare to but for you we do have a baseline and noticed this. It is very subtle and not high enough to warrant any medicine change at this time.  he did the labs today and they pending  he had hip replacement 6 weeks ago --may have had abx at that time. not continuos but in ov . also under anesthesia .  he did not take any narcotics after surgery center he was taking gabapentin and meloxicam  he took the meloxicam for about 2-4 weeks. has been off about 1 moth or so   asked about other nsaids and did ahve some topical diclofenac in the past but not currently   recap U.s was cancelled and not rebooked.  June 27 labs show direct bilirubin 0.3 which is slightly off with total bilirubin 1.5 which means that you have an indirect of 1.2 and that will be consistent with Gilbert's syndrome which is a benign conjugation disorder bilirubin that is seen in about 10% of people. Glucose was 80 BUN of 12 creatinine 0.82 sodium 140 potassium 4.4 calcium 9.4 albumin 4.4. Alk phos normal at 64 AST 22 and ALT 20 with ideal ALT less than 35. WBC 6 hemoglobin 13.7 platelet count 214 MCV 93.4 neutrophils 4048 lymphocytes 1206.   April 6 labs show ASMA actually remaining high at 53 from previous 45 and prior 56.  Desired is less than 20. Albumin 4.3 which is normal and total bilirubin 1.4 elevated with a direct 0.3 and that is compatible with your history of Gilbert's syndrome. As you recall the bilirubin level in [place] tend to be higher if you are fasting. Alkaline phosphatase was down 55 from 66 and prior 78 so good to see that.  AST was slightly lower at 22 and ALT slightly higher at 17.  Ideal ALT less than 35. IgG was normal at 1031 IgM normal at 69 and IgA normal 132 which is good to see.    That means that despite the ASMA being positive there is no sign of any immune activation that was seen.  It does show also how long it can take postexposure to a medication reaction for that immune marker that is triggered to go negative as we are not there yet. He is still on the atorvastatin low dose 10mg po qd.  Pt says he has had a few things happen at the last visit: 1. Started atorvastatin now March 2023 2. Opth added latanoprost for glaucoma x 3m. 3. Had surgery for a finger and needed full anesthesia and some pain meds. Only pain med was at time of surgery. 4. Stepped on cat and bit on calf and had to be on 2 abx for 2 weeks and cefuroxine and metronidazole. He healed well.  January 27 ultrasound shows the liver to have normal echogenicity and no discrete lesions. No gallstones seen. Common bile duct normal at 4.3 mm. Pancreas was normal where seen. Right kidney 10.4 cm with no hydronephrosis. Spleen 8.5 cm. Liver vessels patent as expected. Overall he liver had normal liver echogenicity and no suspicious lesions.  December 13 2022  labs show IgG subclass 4 was normal at 79.2. Albumin was normal at 4.5 bilirubin was 1.2 which is normal with a direct bilirubin 0.3 suggesting that you have [place] syndrome which is a benign conjugation disorder bilirubin seen in about 10% of people.  It tends to run higher when you are fasting for labs and tends to be lower when you are not fasting for labs. Alkaline phosphatase is down to 66 AST stable at 23 and ALT stable at 16. Anti-smooth muscle antibody was lower at 45 from 56 and prior 66 in January of this year. It is still slightly up though. As you recall, we had the prior medicine that was suspected to be causing the liver reaction and its removal improved labs and the hope is for these immune markers to also normalize over time. They did not run the IgM total on you as they apparently had a  labcode change back in June when you were last seen and the old code for IgM was changed without us being advised/aware of to this new code for another test.  We will need to order the IGM test with your next labs. Hepatitis B surface antibody was positive at a strong titer of 469.  Hep A IgM was negative but the hep A total was positive indicating remotely developed immunity being present.  Many times when the hep A total is positive they do a reflex hep A IgM to confirm that it was not a recent antibody development.  He had hepatitis age 6.  Aug 12 u.s shows no gallstones.  Gallbladder wall has normal thickness. Common bile duct normal at 4 mm. Liver was homogeneous/even in echotexture.  No focal liver abnormality was noted. Image pancreas appeared unremarkable. Right kidney measured 10.4 cm with no hydronephrosis. Renal echogenicity was within normal limits. Spleen was normal at 8.4 cm. Liver vessels were patent. Good to note that the u.s did not show any changes.  July 1 labs show albumin 4.5 bilirubin normal at 0.7 and direct bilirubin 0.26.  Alkaline phosphatase 78 AST 23 down from 31.  ALT 17 down from 29.  Back in January AST 56 and ALT 55. Ideal alt less than 35.  As you recall we suspected the doxycycline as the potential cause of this liver issue.  Very happy to see the labs coming down this low off of it.  He had stopped that before the may visit.  Prior mentioned in 2021 he had lost some weight and said his primary did tests and his lfts were up and surprising for him.  Pt says said at time was on some medications and among them was doxycycline and a sleep aid and also atorvastatin and some herbal medications (sleep meds).   He started to remove them and the lfts were coming down and he says most of them in the ok range over time and he says that some that stayed up. GGTP down but the labs up enough that he referred him to Dr Monique and did asma and that was up.   Pt says that the enzymes down and restarted the doxycycline and it went back up and then stopped the doxycycline and dropped some but the asma did not drop and the ggtp did not drop.  Patient seen on January 20, 2022 by Dr. Monique. Patient has abnormal liver labs with labs to continue to show mild elevation at that time.  Patient had an elevated gamma GTP as well as an elevated ASMA.  He discussed the possibility of doing a biopsy. Patient also suspected to have Gilbert syndrome as well. Per Dr. Monique's note patient had a normal CT of the abdomen with regards to the liver. He had no symptoms.  He had been taking daily doxycycline for an ophthalmologic problem as well as n- acetylcysteine for the last 6 to 8 months.  Patient at that visit was noted to have a blood pressure 197/95 (very anxious and has white coat syndrome and says normally ok) and a BMI of 21.67. He says bp normally was good and tazley has days where bp is up 160/90 and has to take some medicine like lisinopril.  Certainly very possible that this could be related to the doxycycline triggering immunity issue. There are case reports that is triggering even overt autoimmune hepatitis.  Patient also did in February 2021 colonoscopy that showed 2 sessile polyps in the ascending colon that were small and removed with cold snare.  Multiple small large mouth diverticuli are seen in sigmoid and descending colon.  October 2021 a smooth muscle antibody of 43 INR 1.0 AST 28 ALT 20 alk phos 75 bilirubin 1.1 direct bilirubin 0.31 and albumin 4.4.  Sodium 141 potassium 4.8 BUN of 13 creatinine 0.86 glucose 99.  White blood cell count 5.5 hemoglobin 14.2 platelet count 265. That was the first time off the meds and those were the normal labs.  January 2022 gamma GTP was elevated at 172 smooth muscle antibody 66 INR 1.1 white cell count 7.7 hemoglobin 14.5 plate count 238 and AST 56 ALT 55 bilirubin 1.2 direct 0.39 alk phos 82 sodium 141 potassium 4.5 BUN of 13 creatinine 0.94 glucose of 88. That was the reexposure time and back on the doxycycline at the time.  February 25, 2022 AST 31 ALT 29 total bili 1.6 direct bilirubin 0.41 albumin 4.6 and smooth muscle antibody 56.  Gamma GTP 66. That was off the meds.  April 2022 did redo labs with primary md and says that the asma and ggtp up and others marginal.  Perlivertox: Doxycycline has been associated with rare instances of hepatic injury, generally arising within 1 to 2 weeks of starting therapy, sometimes with a history of previous administration of the agent without injury. The pattern of injury ranges from hepatocellular to cholestatic and is probably most commonly mixed. The onset is often abrupt and can be accompanied by signs of hypersensitivity, such as fever, rash and eosinophilia (DRESS syndrome). Recovery is usually rapid and usually complete within 4 to 6 weeks. However, instances of severe and prolonged cholestatic liver injury have been reported with oral doxycycline. The autoimmune-like hepatitis that has been described with minocycline has not been linked to doxycycline, despite similarities in chemical structure and similar indications and uses, perhaps because it is used less frequency in a low dose, long term regimen. High dose intravenous doxycycline can cause acute fatty liver typical of that caused by intravenous tetracycline, particularly in susceptible patients such as pregnant women. This type of injury is, however, quite rare. Nevertheless, for these reasons, the duration and dose of parenteral doxycycline therapy should be minimized. Likelihood score: B (highly likely but rare cause of clinically apparent liver injury).  The immune markers may take 3-6 or a 12 months to go back to normal. Sometimes with a rechallenge can worsen the picture.  NAC used by him. It is not toxic to liver. Uses for anxiety. Used SWAPNA in trials for tx liver injury.  Has been on atorvastatin and wants to take a low dose of this daily and would need to look at taking a low dose of this and with close labs. Atorvastatin can also trigger auotimmunity.

## 2024-01-09 LAB
ALBUMIN/GLOBULIN RATIO: 2
ALBUMIN: 4.5
ALKALINE PHOSPHATASE: 78
ALT (SGPT): 15
AST (SGOT): 26
BILIRUBIN, DIRECT: 0.3
BILIRUBIN, INDIRECT: 0.8
BILIRUBIN, TOTAL: 1.1
GLOBULIN: 2.2
PROTEIN, TOTAL: 6.7

## 2024-01-12 ENCOUNTER — TELEPHONE ENCOUNTER (OUTPATIENT)
Dept: URBAN - METROPOLITAN AREA CLINIC 86 | Facility: CLINIC | Age: 69
End: 2024-01-12

## 2024-01-12 ENCOUNTER — WEB ENCOUNTER (OUTPATIENT)
Dept: URBAN - METROPOLITAN AREA CLINIC 86 | Facility: CLINIC | Age: 69
End: 2024-01-12

## 2024-01-12 NOTE — HPI-TODAY'S VISIT:
Dear Tyler Iglesias,   The recent labs were sent to me. I tried calling you an the mailbox was full. The alkaline phosphatase 78, ast 26, alt 15. These are lower than october and happy to see this. You mentioned started the topical therapy prescribed by dermatology but I was not sure of the length of therapy. Please let us know how long. I would recommend monitoring while on treatment to be safe. I will include a lab order for you to do 2 weeks after starting. Please let us know how long they plan to have you on this. Again called you but not able to reach you and could not leave message.  Brittany Arnold PA-C

## 2024-01-18 ENCOUNTER — WEB ENCOUNTER (OUTPATIENT)
Dept: URBAN - METROPOLITAN AREA CLINIC 86 | Facility: CLINIC | Age: 69
End: 2024-01-18

## 2024-01-26 ENCOUNTER — WEB ENCOUNTER (OUTPATIENT)
Dept: URBAN - METROPOLITAN AREA CLINIC 86 | Facility: CLINIC | Age: 69
End: 2024-01-26

## 2024-01-26 ENCOUNTER — TELEPHONE ENCOUNTER (OUTPATIENT)
Dept: URBAN - METROPOLITAN AREA CLINIC 86 | Facility: CLINIC | Age: 69
End: 2024-01-26

## 2024-01-26 ENCOUNTER — LAB OUTSIDE AN ENCOUNTER (OUTPATIENT)
Dept: URBAN - METROPOLITAN AREA CLINIC 86 | Facility: CLINIC | Age: 69
End: 2024-01-26

## 2024-01-26 LAB
ALBUMIN/GLOBULIN RATIO: 2.3
ALBUMIN: 4.5
ALKALINE PHOSPHATASE: 64
ALT (SGPT): 18
AST (SGOT): 24
BILIRUBIN, DIRECT: 0.3
BILIRUBIN, INDIRECT: 0.9
BILIRUBIN, TOTAL: 1.2
GLOBULIN: 2
PROTEIN, TOTAL: 6.5

## 2024-01-26 NOTE — HPI-TODAY'S VISIT:
Dear Tyler Iglesias,  The 1/25/24 labs were sent to me. The bilirubin 1.2, alkaline [id number], ast 24, alt 18, previously the ast 26, alt 15 so the ast is actually lower now. Seems that there has not been an affect on the labs with the meds.  Brittany Arnold PA-C

## 2024-04-01 ENCOUNTER — LAB (OUTPATIENT)
Dept: URBAN - METROPOLITAN AREA TELEHEALTH 2 | Facility: TELEHEALTH | Age: 69
End: 2024-04-01

## 2024-04-08 LAB
ACTIN (SMOOTH MUSCLE) ANTIBODY (IGG): 59
ALBUMIN/GLOBULIN RATIO: 2.6
ALBUMIN: 4.7
ALKALINE PHOSPHATASE: 59
ALT (SGPT): 28
AST (SGOT): 29
BILIRUBIN, DIRECT: 0.4
BILIRUBIN, INDIRECT: 1.2
BILIRUBIN, TOTAL: 1.6
GLOBULIN: 1.8
PROTEIN, TOTAL: 6.5

## 2024-04-09 ENCOUNTER — LAB (OUTPATIENT)
Dept: URBAN - METROPOLITAN AREA TELEHEALTH 2 | Facility: TELEHEALTH | Age: 69
End: 2024-04-09

## 2024-04-09 ENCOUNTER — TELEP (OUTPATIENT)
Dept: URBAN - METROPOLITAN AREA TELEHEALTH 2 | Facility: TELEHEALTH | Age: 69
End: 2024-04-09
Payer: MEDICARE

## 2024-04-09 DIAGNOSIS — K71.9 DRUG INDUCED LIVER DISEASE: ICD-10-CM

## 2024-04-09 DIAGNOSIS — R79.89 ABNORMAL LFTS: ICD-10-CM

## 2024-04-09 DIAGNOSIS — E80.4 GILBERT SYNDROME: ICD-10-CM

## 2024-04-09 DIAGNOSIS — E78.5 HYPERLIPIDEMIA: ICD-10-CM

## 2024-04-09 PROCEDURE — 99214 OFFICE O/P EST MOD 30 MIN: CPT | Performed by: PHYSICIAN ASSISTANT

## 2024-04-09 RX ORDER — LISINOPRIL 10 MG/1
1 TABLET TABLET ORAL ONCE A DAY
Status: ACTIVE | COMMUNITY

## 2024-04-09 RX ORDER — LATANOPROST 50 UG/ML
DROP TO BOTH EYES SOLUTION/ DROPS OPHTHALMIC ONCE A DAY
Status: ACTIVE | COMMUNITY

## 2024-04-09 RX ORDER — ASCORBIC ACID 1000 MG
1 TABLET TABLET ORAL ONCE A DAY
Status: ACTIVE | COMMUNITY

## 2024-04-09 RX ORDER — TIMOLOL/BRIMONIDIN/DORZOLAM/PF 0.5-.15-2%
AS DIRECTED DROPS OPHTHALMIC (EYE)
Status: ACTIVE | COMMUNITY

## 2024-04-09 RX ORDER — ATORVASTATIN CALCIUM 10 MG/1
1 TABLET TABLET, FILM COATED ORAL ONCE A DAY
Status: ACTIVE | COMMUNITY

## 2024-04-09 RX ORDER — METRONIDAZOLE 10 MG/G
1 APPLICATION GEL TOPICAL
Status: ACTIVE | COMMUNITY

## 2024-04-09 NOTE — HPI-TODAY'S VISIT:
Patient is a 68-year-old male and last July 2023 and seeing Balbina Fall and we are asked to see regarding abnormal liver labs and possible immune issues.  A copy of the note will be sent to the referring provider.  4/9/24 telemed     4/5/24 labsThe ASMA remains elevated at 59. Previously 53 and unclear why higher but suggest we still need to continue to monitor you. The bilirubin up at 1.6, previously 1.2. Have you been sick recently? You have history of gilberts and this can happen with illness and fasting. The AST 29 and ALT 28 and previously 24 and 18 so slightly higher and will review at the visit tomorrow.  The 1/25/24 labs were sent to me. The bilirubin 1.2, alkaline [id number], ast 24, alt 18, previously the ast 26, alt 15 so the ast is actually lower now. Seems that there has not been an affect on the labs with the meds.  Brittany Arnold PA-C  reviewed the above and he said he has had some meds for dental procedures and effidex for the skin since last visit  he had clindamycin, effidex, azirthromycin also had to take due to cat bite.  he has had several rounds of abx disucssed asma still positive and need to monitor the labs given this .    recap  He was out of town and did labs 2 days ago and did at [facility] and not back yet.  1/8/24 telemed Dealaxmi Iglesias, Oct 6 ultrasound shows liver to be normal in contour and top normal in echogenicity but with no suspicious lesions. No gallbladder wall thickening or stone seen. Common bile duct normal at 4.8 mm. Right kidney 10.7 cm with no hydronephrosis. Spleen normal at 8.7 cm. Pancreas tail was predominantly obscured by gas but the imagef pancreas portions otherwise were grossly unremarkable. Liver vessels were patent as expected. Splenic vessels were also patent as expected. Overall they saw no signs of any advanced fibrosis or suspicious lesions and liver had patent vessels. I compared this to your January 27 ultrasound and that showed the liver to have normal echogenicity and no discrete lesions at that timeframe.We will need to repeat this ultrasound again in 6 months. Dr Magana Dear Tyler Iglesias,  Your Oct 3 labs shows that the smooth muscle antibody remains elevated at 53 and was also elevated at 53 in April and in December had been 45. Need to watch that trend. Despite this, your liver labs are stable with the albumin normal at 4.4 and bilirubin slightly up at 1.5 with a direct 0.4 which would be very suspicious for Gilbert's syndrome which is that benign conjugation disorder. It tends to be higher when you are fasting and lower when you are not. Your alk phos was normal at 68 AST 34 ALT 23 with ideal ALT less than 35. I would point out that back in April your AST had been 22 and ALT of 17 so while within the low normal range and ideal still they are slightly higher and that is worth watching. I would ask you to repeat the labs again before your January visit to see what that shows. From my notes the main concern that we have for these labs to be up possibly a little bit was that you were taking the cefdinir which is an antibiotic and in its not uncommon for all antibiotics to cause some slight fluctuations in the labs and most people do not perceive it because they do not have a recent baseline to compare to but for you we do have a baseline and noticed this. It is very subtle and not high enough to warrant any medicine change at this time.  he did the labs today and they pending  he had hip replacement 6 weeks ago --may have had abx at that time. not continuos but in ov . also under anesthesia .  he did not take any narcotics after surgery center he was taking gabapentin and meloxicam  he took the meloxicam for about 2-4 weeks. has been off about 1 moth or so   asked about other nsaids and did ahve some topical diclofenac in the past but not currently   U.s was cancelled and not rebooked.  June 27 labs show direct bilirubin 0.3 which is slightly off with total bilirubin 1.5 which means that you have an indirect of 1.2 and that will be consistent with Gilbert's syndrome which is a benign conjugation disorder bilirubin that is seen in about 10% of people. Glucose was 80 BUN of 12 creatinine 0.82 sodium 140 potassium 4.4 calcium 9.4 albumin 4.4. Alk phos normal at 64 AST 22 and ALT 20 with ideal ALT less than 35. WBC 6 hemoglobin 13.7 platelet count 214 MCV 93.4 neutrophils 4048 lymphocytes 1206.   April 6 labs show ASMA actually remaining high at 53 from previous 45 and prior 56.  Desired is less than 20. Albumin 4.3 which is normal and total bilirubin 1.4 elevated with a direct 0.3 and that is compatible with your history of Gilbert's syndrome. As you recall the bilirubin level in [place] tend to be higher if you are fasting. Alkaline phosphatase was down 55 from 66 and prior 78 so good to see that.  AST was slightly lower at 22 and ALT slightly higher at 17.  Ideal ALT less than 35. IgG was normal at 1031 IgM normal at 69 and IgA normal 132 which is good to see.    That means that despite the ASMA being positive there is no sign of any immune activation that was seen.  It does show also how long it can take postexposure to a medication reaction for that immune marker that is triggered to go negative as we are not there yet. He is still on the atorvastatin low dose 10mg po qd.  Pt says he has had a few things happen at the last visit: 1. Started atorvastatin now March 2023 2. Opth added latanoprost for glaucoma x 3m. 3. Had surgery for a finger and needed full anesthesia and some pain meds. Only pain med was at time of surgery. 4. Stepped on cat and bit on calf and had to be on 2 abx for 2 weeks and cefuroxine and metronidazole. He healed well.  January 27 ultrasound shows the liver to have normal echogenicity and no discrete lesions. No gallstones seen. Common bile duct normal at 4.3 mm. Pancreas was normal where seen. Right kidney 10.4 cm with no hydronephrosis. Spleen 8.5 cm. Liver vessels patent as expected. Overall he liver had normal liver echogenicity and no suspicious lesions.  December 13 2022  labs show IgG subclass 4 was normal at 79.2. Albumin was normal at 4.5 bilirubin was 1.2 which is normal with a direct bilirubin 0.3 suggesting that you have [place] syndrome which is a benign conjugation disorder bilirubin seen in about 10% of people.  It tends to run higher when you are fasting for labs and tends to be lower when you are not fasting for labs. Alkaline phosphatase is down to 66 AST stable at 23 and ALT stable at 16. Anti-smooth muscle antibody was lower at 45 from 56 and prior 66 in January of this year. It is still slightly up though. As you recall, we had the prior medicine that was suspected to be causing the liver reaction and its removal improved labs and the hope is for these immune markers to also normalize over time. They did not run the IgM total on you as they apparently had a  labcode change back in June when you were last seen and the old code for IgM was changed without us being advised/aware of to this new code for another test.  We will need to order the IGM test with your next labs. Hepatitis B surface antibody was positive at a strong titer of 469.  Hep A IgM was negative but the hep A total was positive indicating remotely developed immunity being present.  Many times when the hep A total is positive they do a reflex hep A IgM to confirm that it was not a recent antibody development.  He had hepatitis age 6.  Aug 12 u.s shows no gallstones.  Gallbladder wall has normal thickness. Common bile duct normal at 4 mm. Liver was homogeneous/even in echotexture.  No focal liver abnormality was noted. Image pancreas appeared unremarkable. Right kidney measured 10.4 cm with no hydronephrosis. Renal echogenicity was within normal limits. Spleen was normal at 8.4 cm. Liver vessels were patent. Good to note that the u.s did not show any changes.  July 1 labs show albumin 4.5 bilirubin normal at 0.7 and direct bilirubin 0.26.  Alkaline phosphatase 78 AST 23 down from 31.  ALT 17 down from 29.  Back in January AST 56 and ALT 55. Ideal alt less than 35.  As you recall we suspected the doxycycline as the potential cause of this liver issue.  Very happy to see the labs coming down this low off of it.  He had stopped that before the may visit.  Prior mentioned in 2021 he had lost some weight and said his primary did tests and his lfts were up and surprising for him.  Pt says said at time was on some medications and among them was doxycycline and a sleep aid and also atorvastatin and some herbal medications (sleep meds).   He started to remove them and the lfts were coming down and he says most of them in the ok range over time and he says that some that stayed up. GGTP down but the labs up enough that he referred him to Dr Monique and did asma and that was up.   Pt says that the enzymes down and restarted the doxycycline and it went back up and then stopped the doxycycline and dropped some but the asma did not drop and the ggtp did not drop.  Patient seen on January 20, 2022 by Dr. Monique. Patient has abnormal liver labs with labs to continue to show mild elevation at that time.  Patient had an elevated gamma GTP as well as an elevated ASMA.  He discussed the possibility of doing a biopsy. Patient also suspected to have Gilbert syndrome as well. Per Dr. Monique's note patient had a normal CT of the abdomen with regards to the liver. He had no symptoms.  He had been taking daily doxycycline for an ophthalmologic problem as well as n- acetylcysteine for the last 6 to 8 months.  Patient at that visit was noted to have a blood pressure 197/95 (very anxious and has white coat syndrome and says normally ok) and a BMI of 21.67. He says bp normally was good and lateley has days where bp is up 160/90 and has to take some medicine like lisinopril.  Certainly very possible that this could be related to the doxycycline triggering immunity issue. There are case reports that is triggering even overt autoimmune hepatitis.  Patient also did in February 2021 colonoscopy that showed 2 sessile polyps in the ascending colon that were small and removed with cold snare.  Multiple small large mouth diverticuli are seen in sigmoid and descending colon.  October 2021 a smooth muscle antibody of 43 INR 1.0 AST 28 ALT 20 alk phos 75 bilirubin 1.1 direct bilirubin 0.31 and albumin 4.4.  Sodium 141 potassium 4.8 BUN of 13 creatinine 0.86 glucose 99.  White blood cell count 5.5 hemoglobin 14.2 platelet count 265. That was the first time off the meds and those were the normal labs.  January 2022 gamma GTP was elevated at 172 smooth muscle antibody 66 INR 1.1 white cell count 7.7 hemoglobin 14.5 plate count 238 and AST 56 ALT 55 bilirubin 1.2 direct 0.39 alk phos 82 sodium 141 potassium 4.5 BUN of 13 creatinine 0.94 glucose of 88. That was the reexposure time and back on the doxycycline at the time.  February 25, 2022 AST 31 ALT 29 total bili 1.6 direct bilirubin 0.41 albumin 4.6 and smooth muscle antibody 56.  Gamma GTP 66. That was off the meds.  April 2022 did redo labs with primary md and says that the asma and ggtp up and others marginal.  Perlivertox: Doxycycline has been associated with rare instances of hepatic injury, generally arising within 1 to 2 weeks of starting therapy, sometimes with a history of previous administration of the agent without injury. The pattern of injury ranges from hepatocellular to cholestatic and is probably most commonly mixed. The onset is often abrupt and can be accompanied by signs of hypersensitivity, such as fever, rash and eosinophilia (DRESS syndrome). Recovery is usually rapid and usually complete within 4 to 6 weeks. However, instances of severe and prolonged cholestatic liver injury have been reported with oral doxycycline. The autoimmune-like hepatitis that has been described with minocycline has not been linked to doxycycline, despite similarities in chemical structure and similar indications and uses, perhaps because it is used less frequency in a low dose, long term regimen. High dose intravenous doxycycline can cause acute fatty liver typical of that caused by intravenous tetracycline, particularly in susceptible patients such as pregnant women. This type of injury is, however, quite rare. Nevertheless, for these reasons, the duration and dose of parenteral doxycycline therapy should be minimized. Likelihood score: B (highly likely but rare cause of clinically apparent liver injury).  The immune markers may take 3-6 or a 12 months to go back to normal. Sometimes with a rechallenge can worsen the picture.  NAC used by him. It is not toxic to liver. Uses for anxiety. Used group home in trials for tx liver injury.  Has been on atorvastatin and wants to take a low dose of this daily and would need to look at taking a low dose of this and with close labs. Atorvastatin can also trigger auotimmunity.

## 2024-04-18 ENCOUNTER — LAB (OUTPATIENT)
Dept: URBAN - METROPOLITAN AREA TELEHEALTH 2 | Facility: TELEHEALTH | Age: 69
End: 2024-04-18

## 2024-05-03 ENCOUNTER — OFFICE VISIT (OUTPATIENT)
Dept: URBAN - NONMETROPOLITAN AREA CLINIC 1 | Facility: CLINIC | Age: 69
End: 2024-05-03

## 2024-06-05 ENCOUNTER — TELEPHONE ENCOUNTER (OUTPATIENT)
Dept: URBAN - METROPOLITAN AREA CLINIC 91 | Facility: CLINIC | Age: 69
End: 2024-06-05

## 2024-06-14 ENCOUNTER — OFFICE VISIT (OUTPATIENT)
Dept: URBAN - NONMETROPOLITAN AREA CLINIC 1 | Facility: CLINIC | Age: 69
End: 2024-06-14
Payer: MEDICARE

## 2024-06-14 DIAGNOSIS — R93.2 ABNORMAL ULTRASOUND OF LIVER: ICD-10-CM

## 2024-06-14 PROCEDURE — 76705 ECHO EXAM OF ABDOMEN: CPT

## 2024-06-14 PROCEDURE — 93975 VASCULAR STUDY: CPT

## 2024-06-17 ENCOUNTER — TELEPHONE ENCOUNTER (OUTPATIENT)
Dept: URBAN - METROPOLITAN AREA CLINIC 86 | Facility: CLINIC | Age: 69
End: 2024-06-17

## 2024-06-17 NOTE — HPI-TODAY'S VISIT:
Dear Tyler Iglesias,  June 14 ultrasound sent to us.  Liver echogenicity appeared within normal limits with no lesions.  Common bile duct normal at 5 mm.  Gallbladder had no gallstones or thickening of the gallbladder wall.  No significant ascites was seen.  Visualized pancreas portions appeared unremarkable.  Right kidney was 10.4 cm with no hydronephrosis.  Liver vessels were patent as were splenic vessels.  Spleen was normal at 8.7 cm.  Overall they felt that you had a normal exam.  As you recall, your October 6 2023 ultrasound mentioned the liver was normal in contour and top normal in echogenicity and that appears to be doing better at this timeframe.  Please share with local providers.  Dr Magana

## 2024-07-09 ENCOUNTER — LAB OUTSIDE AN ENCOUNTER (OUTPATIENT)
Dept: URBAN - METROPOLITAN AREA TELEHEALTH 2 | Facility: TELEHEALTH | Age: 69
End: 2024-07-09

## 2024-07-10 ENCOUNTER — TELEPHONE ENCOUNTER (OUTPATIENT)
Dept: URBAN - METROPOLITAN AREA CLINIC 86 | Facility: CLINIC | Age: 69
End: 2024-07-10

## 2024-07-10 LAB
A/G RATIO: 2
ABSOLUTE BASOPHILS: 39
ABSOLUTE EOSINOPHILS: 50
ABSOLUTE LYMPHOCYTES: 1106
ABSOLUTE MONOCYTES: 462
ABSOLUTE NEUTROPHILS: 3845
ALBUMIN: 4.5
ALKALINE PHOSPHATASE: 61
ALT (SGPT): 21
AST (SGOT): 24
BASOPHILS: 0.7
BILIRUBIN, TOTAL: 1.6
BUN/CREATININE RATIO: (no result)
BUN: 15
CALCIUM: 9.1
CARBON DIOXIDE, TOTAL: 26
CHLORIDE: 105
CREATININE: 0.83
EGFR: 95
EOSINOPHILS: 0.9
GLOBULIN, TOTAL: 2.3
GLUCOSE: 76
HEMATOCRIT: 42.3
HEMOGLOBIN: 14
LYMPHOCYTES: 20.1
MCH: 32.3
MCHC: 33.1
MCV: 97.5
MONOCYTES: 8.4
MPV: 9.4
NEUTROPHILS: 69.9
PLATELET COUNT: 232
POTASSIUM: 4.5
PROTEIN, TOTAL: 6.8
RDW: 12.6
RED BLOOD CELL COUNT: 4.34
SODIUM: 139
WHITE BLOOD CELL COUNT: 5.5

## 2024-07-10 NOTE — HPI-TODAY'S VISIT:
Dear Tyler Iglesias,  July 9 labs showed glucose 76 BUN of 15 creatinine 0.3 sodium 139 potassium 4.5 calcium 9.1 albumin 4.1 bilirubin slightly up at 1.6 but not fractionated and you are known to have Gilbert's.  Alk phos 61 AST 24 and ALT 21 with ideal ALT less than 35.  WBC 5.5 hemoglobin 14 platelet count 232 MCV 97.5 with normal neutrophils and lymphocytes.  Happy to see that your labs are stable.  Dr Magana

## 2024-10-01 ENCOUNTER — LAB OUTSIDE AN ENCOUNTER (OUTPATIENT)
Dept: URBAN - METROPOLITAN AREA TELEHEALTH 2 | Facility: TELEHEALTH | Age: 69
End: 2024-10-01

## 2024-10-08 ENCOUNTER — LAB OUTSIDE AN ENCOUNTER (OUTPATIENT)
Dept: URBAN - METROPOLITAN AREA CLINIC 92 | Facility: CLINIC | Age: 69
End: 2024-10-08

## 2024-10-09 ENCOUNTER — OFFICE VISIT (OUTPATIENT)
Dept: URBAN - METROPOLITAN AREA TELEHEALTH 2 | Facility: TELEHEALTH | Age: 69
End: 2024-10-09
Payer: MEDICARE

## 2024-10-09 DIAGNOSIS — Q15.9 EYE ABNORMALITY: ICD-10-CM

## 2024-10-09 DIAGNOSIS — E80.4 GILBERT SYNDROME: ICD-10-CM

## 2024-10-09 DIAGNOSIS — Z86.19 H/O ROCKY MOUNTAIN SPOTTED FEVER: ICD-10-CM

## 2024-10-09 DIAGNOSIS — E78.5 HYPERLIPIDEMIA: ICD-10-CM

## 2024-10-09 DIAGNOSIS — Z71.89 VACCINE COUNSELING: ICD-10-CM

## 2024-10-09 DIAGNOSIS — Z79.899 HIGH RISK MEDICATION USE: ICD-10-CM

## 2024-10-09 DIAGNOSIS — R03.0 ELEVATED BLOOD PRESSURE: ICD-10-CM

## 2024-10-09 DIAGNOSIS — R74.8 ABNORMAL LIVER ENZYMES: ICD-10-CM

## 2024-10-09 DIAGNOSIS — K71.9 DRUG INDUCED LIVER DISEASE: ICD-10-CM

## 2024-10-09 DIAGNOSIS — H40.9 GLAUCOMA: ICD-10-CM

## 2024-10-09 LAB
A/G RATIO: 2
ABSOLUTE BASOPHILS: 48
ABSOLUTE EOSINOPHILS: 110
ABSOLUTE LYMPHOCYTES: 1162
ABSOLUTE MONOCYTES: 470
ABSOLUTE NEUTROPHILS: 3010
ALBUMIN: 4.5
ALKALINE PHOSPHATASE: 49
ALT (SGPT): 14
AST (SGOT): 23
BASOPHILS: 1
BILIRUBIN, TOTAL: 1.6
BUN/CREATININE RATIO: (no result)
BUN: 14
CALCIUM: 8.9
CARBON DIOXIDE, TOTAL: 24
CHLORIDE: 106
CREATININE: 0.77
EGFR: 96
EOSINOPHILS: 2.3
GLOBULIN, TOTAL: 2.2
GLUCOSE: 86
HEMATOCRIT: 42.5
HEMOGLOBIN: 13.9
LYMPHOCYTES: 24.2
MCH: 31.4
MCHC: 32.7
MCV: 96.2
MONOCYTES: 9.8
MPV: 9.8
NEUTROPHILS: 62.7
PLATELET COUNT: 219
POTASSIUM: 4.7
PROTEIN, TOTAL: 6.7
RDW: 12.2
RED BLOOD CELL COUNT: 4.42
SODIUM: 139
WHITE BLOOD CELL COUNT: 4.8

## 2024-10-09 PROCEDURE — 99214 OFFICE O/P EST MOD 30 MIN: CPT | Performed by: PHYSICIAN ASSISTANT

## 2024-10-09 RX ORDER — LATANOPROST 50 UG/ML
DROP TO BOTH EYES SOLUTION/ DROPS OPHTHALMIC ONCE A DAY
Status: ACTIVE | COMMUNITY

## 2024-10-09 RX ORDER — LISINOPRIL 10 MG/1
1 TABLET TABLET ORAL ONCE A DAY
Status: ACTIVE | COMMUNITY

## 2024-10-09 RX ORDER — TIMOLOL/BRIMONIDIN/DORZOLAM/PF 0.5-.15-2%
AS DIRECTED DROPS OPHTHALMIC (EYE)
Status: ACTIVE | COMMUNITY

## 2024-10-09 RX ORDER — ATORVASTATIN CALCIUM 10 MG/1
1 TABLET TABLET, FILM COATED ORAL ONCE A DAY
Status: ACTIVE | COMMUNITY

## 2024-10-09 RX ORDER — ASCORBIC ACID 1000 MG
1 TABLET TABLET ORAL ONCE A DAY
Status: ACTIVE | COMMUNITY

## 2024-10-09 RX ORDER — METRONIDAZOLE 10 MG/G
1 APPLICATION GEL TOPICAL
Status: ACTIVE | COMMUNITY

## 2024-10-09 NOTE — HPI-TODAY'S VISIT:
Patient is a 69-year-old male and last July 2023 and seeing Balbina Fall and we are asked to see regarding abnormal liver labs and possible immune issues.  A copy of the note will be sent to the referring provider.  10/9/24 He did labs yesterday and those are pending he went [place] and dx with [place] spotted fever tx for this is doxycycline and will be starting this and had first dose today he will be on this bid x 10 days worrth checking labs at end of treatment and then 1 month off it spikes   July 9 labs showed glucose 76 BUN of 15 creatinine 0.3 sodium 139 potassium 4.5 calcium 9.1 albumin 4.1 bilirubin slightly up at 1.6 but not fractionated and you are known to have Gilbert's.  Alk phos 61 AST 24 and ALT 21 with ideal ALT less than 35.  WBC 5.5 hemoglobin 14 platelet count 232 MCV 97.5 with normal neutrophils and lymphocytes.  Happy to see that your labs are stable.  June 14 ultrasound sent to us.  Liver echogenicity appeared within normal limits with no lesions.  Common bile duct normal at 5 mm.  Gallbladder had no gallstones or thickening of the gallbladder wall.  No significant ascites was seen.  Visualized pancreas portions appeared unremarkable.  Right kidney was 10.4 cm with no hydronephrosis.  Liver vessels were patent as were splenic vessels.  Spleen was normal at 8.7 cm.  Overall they felt that you had a normal exam.  As you recall, your October 6 2023 ultrasound mentioned the liver was normal in contour and top normal in echogenicity and that appears to be doing better at this timeframe.  Please share with local providers.  Dr Magana  4/9/24 telemed    4/5/24 labsThe ASMA remains elevated at 59. Previously 53 and unclear why higher but suggest we still need to continue to monitor you. The bilirubin up at 1.6, previously 1.2. Have you been sick recently? You have history of gilberts and this can happen with illness and fasting. The AST 29 and ALT 28 and previously 24 and 18 so slightly higher and will review at the visit tomorrow.  The 1/25/24 labs were sent to me. The bilirubin 1.2, alkaline [id number], ast 24, alt 18, previously the ast 26, alt 15 so the ast is actually lower now. Seems that there has not been an affect on the labs with the meds.  Brittany Arnold PA-C  reviewed the above and he said he has had some meds for dental procedures and effidex for the skin since last visit  he had clindamycin, effidex, azirthromycin also had to take due to cat bite.  he has had several rounds of abx disucssed asma still positive and need to monitor the labs given this .    recap  He was out of town and did labs 2 days ago and did at [facility] and not back yet.  1/8/24 telemed Dear Tyler Iglesias, Oct 6 ultrasound shows liver to be normal in contour and top normal in echogenicity but with no suspicious lesions. No gallbladder wall thickening or stone seen. Common bile duct normal at 4.8 mm. Right kidney 10.7 cm with no hydronephrosis. Spleen normal at 8.7 cm. Pancreas tail was predominantly obscured by gas but the imagef pancreas portions otherwise were grossly unremarkable. Liver vessels were patent as expected. Splenic vessels were also patent as expected. Overall they saw no signs of any advanced fibrosis or suspicious lesions and liver had patent vessels. I compared this to your January 27 ultrasound and that showed the liver to have normal echogenicity and no discrete lesions at that timeframe.We will need to repeat this ultrasound again in 6 months. Dr Magana Dear Tyler Iglesias,  Your Oct 3 labs shows that the smooth muscle antibody remains elevated at 53 and was also elevated at 53 in April and in December had been 45. Need to watch that trend. Despite this, your liver labs are stable with the albumin normal at 4.4 and bilirubin slightly up at 1.5 with a direct 0.4 which would be very suspicious for Gilbert's syndrome which is that benign conjugation disorder. It tends to be higher when you are fasting and lower when you are not. Your alk phos was normal at 68 AST 34 ALT 23 with ideal ALT less than 35. I would point out that back in April your AST had been 22 and ALT of 17 so while within the low normal range and ideal still they are slightly higher and that is worth watching. I would ask you to repeat the labs again before your January visit to see what that shows. From my notes the main concern that we have for these labs to be up possibly a little bit was that you were taking the cefdinir which is an antibiotic and in its not uncommon for all antibiotics to cause some slight fluctuations in the labs and most people do not perceive it because they do not have a recent baseline to compare to but for you we do have a baseline and noticed this. It is very subtle and not high enough to warrant any medicine change at this time.  he did the labs today and they pending  he had hip replacement 6 weeks ago --may have had abx at that time. not continuos but in ov . also under anesthesia .  he did not take any narcotics after surgery center he was taking gabapentin and meloxicam  he took the meloxicam for about 2-4 weeks. has been off about 1 moth or so   asked about other nsaids and did ahve some topical diclofenac in the past but not currently   U.s was cancelled and not rebooked.  June 27 labs show direct bilirubin 0.3 which is slightly off with total bilirubin 1.5 which means that you have an indirect of 1.2 and that will be consistent with Gilbert's syndrome which is a benign conjugation disorder bilirubin that is seen in about 10% of people. Glucose was 80 BUN of 12 creatinine 0.82 sodium 140 potassium 4.4 calcium 9.4 albumin 4.4. Alk phos normal at 64 AST 22 and ALT 20 with ideal ALT less than 35. WBC 6 hemoglobin 13.7 platelet count 214 MCV 93.4 neutrophils 4048 lymphocytes 1206.   April 6 labs show ASMA actually remaining high at 53 from previous 45 and prior 56.  Desired is less than 20. Albumin 4.3 which is normal and total bilirubin 1.4 elevated with a direct 0.3 and that is compatible with your history of Gilbert's syndrome. As you recall the bilirubin level in [place] tend to be higher if you are fasting. Alkaline phosphatase was down 55 from 66 and prior 78 so good to see that.  AST was slightly lower at 22 and ALT slightly higher at 17.  Ideal ALT less than 35. IgG was normal at 1031 IgM normal at 69 and IgA normal 132 which is good to see.    That means that despite the ASMA being positive there is no sign of any immune activation that was seen.  It does show also how long it can take postexposure to a medication reaction for that immune marker that is triggered to go negative as we are not there yet. He is still on the atorvastatin low dose 10mg po qd.  Pt says he has had a few things happen at the last visit: 1. Started atorvastatin now March 2023 2. Opth added latanoprost for glaucoma x 3m. 3. Had surgery for a finger and needed full anesthesia and some pain meds. Only pain med was at time of surgery. 4. Stepped on cat and bit on calf and had to be on 2 abx for 2 weeks and cefuroxine and metronidazole. He healed well.  January 27 ultrasound shows the liver to have normal echogenicity and no discrete lesions. No gallstones seen. Common bile duct normal at 4.3 mm. Pancreas was normal where seen. Right kidney 10.4 cm with no hydronephrosis. Spleen 8.5 cm. Liver vessels patent as expected. Overall he liver had normal liver echogenicity and no suspicious lesions.  December 13 2022  labs show IgG subclass 4 was normal at 79.2. Albumin was normal at 4.5 bilirubin was 1.2 which is normal with a direct bilirubin 0.3 suggesting that you have [place] syndrome which is a benign conjugation disorder bilirubin seen in about 10% of people.  It tends to run higher when you are fasting for labs and tends to be lower when you are not fasting for labs. Alkaline phosphatase is down to 66 AST stable at 23 and ALT stable at 16. Anti-smooth muscle antibody was lower at 45 from 56 and prior 66 in January of this year. It is still slightly up though. As you recall, we had the prior medicine that was suspected to be causing the liver reaction and its removal improved labs and the hope is for these immune markers to also normalize over time. They did not run the IgM total on you as they apparently had a  labcode change back in June when you were last seen and the old code for IgM was changed without us being advised/aware of to this new code for another test.  We will need to order the IGM test with your next labs. Hepatitis B surface antibody was positive at a strong titer of 469.  Hep A IgM was negative but the hep A total was positive indicating remotely developed immunity being present.  Many times when the hep A total is positive they do a reflex hep A IgM to confirm that it was not a recent antibody development.  He had hepatitis age 6.  Aug 12 u.s shows no gallstones.  Gallbladder wall has normal thickness. Common bile duct normal at 4 mm. Liver was homogeneous/even in echotexture.  No focal liver abnormality was noted. Image pancreas appeared unremarkable. Right kidney measured 10.4 cm with no hydronephrosis. Renal echogenicity was within normal limits. Spleen was normal at 8.4 cm. Liver vessels were patent. Good to note that the u.s did not show any changes.  July 1 labs show albumin 4.5 bilirubin normal at 0.7 and direct bilirubin 0.26.  Alkaline phosphatase 78 AST 23 down from 31.  ALT 17 down from 29.  Back in January AST 56 and ALT 55. Ideal alt less than 35.  As you recall we suspected the doxycycline as the potential cause of this liver issue.  Very happy to see the labs coming down this low off of it.  He had stopped that before the may visit.  Prior mentioned in 2021 he had lost some weight and said his primary did tests and his lfts were up and surprising for him.  Pt says said at time was on some medications and among them was doxycycline and a sleep aid and also atorvastatin and some herbal medications (sleep meds).   He started to remove them and the lfts were coming down and he says most of them in the ok range over time and he says that some that stayed up. GGTP down but the labs up enough that he referred him to Dr Monique and did asma and that was up.   Pt says that the enzymes down and restarted the doxycycline and it went back up and then stopped the doxycycline and dropped some but the asma did not drop and the ggtp did not drop.  Patient seen on January 20, 2022 by Dr. Monique. Patient has abnormal liver labs with labs to continue to show mild elevation at that time.  Patient had an elevated gamma GTP as well as an elevated ASMA.  He discussed the possibility of doing a biopsy. Patient also suspected to have Gilbert syndrome as well. Per Dr. Monique's note patient had a normal CT of the abdomen with regards to the liver. He had no symptoms.  He had been taking daily doxycycline for an ophthalmologic problem as well as n- acetylcysteine for the last 6 to 8 months.  Patient at that visit was noted to have a blood pressure 197/95 (very anxious and has white coat syndrome and says normally ok) and a BMI of 21.67. He says bp normally was good and lateley has days where bp is up 160/90 and has to take some medicine like lisinopril.  Certainly very possible that this could be related to the doxycycline triggering immunity issue. There are case reports that is triggering even overt autoimmune hepatitis.  Patient also did in February 2021 colonoscopy that showed 2 sessile polyps in the ascending colon that were small and removed with cold snare.  Multiple small large mouth diverticuli are seen in sigmoid and descending colon.  October 2021 a smooth muscle antibody of 43 INR 1.0 AST 28 ALT 20 alk phos 75 bilirubin 1.1 direct bilirubin 0.31 and albumin 4.4.  Sodium 141 potassium 4.8 BUN of 13 creatinine 0.86 glucose 99.  White blood cell count 5.5 hemoglobin 14.2 platelet count 265. That was the first time off the meds and those were the normal labs.  January 2022 gamma GTP was elevated at 172 smooth muscle antibody 66 INR 1.1 white cell count 7.7 hemoglobin 14.5 plate count 238 and AST 56 ALT 55 bilirubin 1.2 direct 0.39 alk phos 82 sodium 141 potassium 4.5 BUN of 13 creatinine 0.94 glucose of 88. That was the reexposure time and back on the doxycycline at the time.  February 25, 2022 AST 31 ALT 29 total bili 1.6 direct bilirubin 0.41 albumin 4.6 and smooth muscle antibody 56.  Gamma GTP 66. That was off the meds.  April 2022 did redo labs with primary md and says that the asma and ggtp up and others marginal.  Perlivertox: Doxycycline has been associated with rare instances of hepatic injury, generally arising within 1 to 2 weeks of starting therapy, sometimes with a history of previous administration of the agent without injury. The pattern of injury ranges from hepatocellular to cholestatic and is probably most commonly mixed. The onset is often abrupt and can be accompanied by signs of hypersensitivity, such as fever, rash and eosinophilia (DRESS syndrome). Recovery is usually rapid and usually complete within 4 to 6 weeks. However, instances of severe and prolonged cholestatic liver injury have been reported with oral doxycycline. The autoimmune-like hepatitis that has been described with minocycline has not been linked to doxycycline, despite similarities in chemical structure and similar indications and uses, perhaps because it is used less frequency in a low dose, long term regimen. High dose intravenous doxycycline can cause acute fatty liver typical of that caused by intravenous tetracycline, particularly in susceptible patients such as pregnant women. This type of injury is, however, quite rare. Nevertheless, for these reasons, the duration and dose of parenteral doxycycline therapy should be minimized. Likelihood score: B (highly likely but rare cause of clinically apparent liver injury).  The immune markers may take 3-6 or a 12 months to go back to normal. Sometimes with a rechallenge can worsen the picture.  NAC used by him. It is not toxic to liver. Uses for anxiety. Used [facility] in trials for tx liver injury.  Has been on atorvastatin and wants to take a low dose of this daily and would need to look at taking a low dose of this and with close labs. Atorvastatin can also trigger auotimmunity.

## 2024-10-14 ENCOUNTER — TELEPHONE ENCOUNTER (OUTPATIENT)
Dept: URBAN - METROPOLITAN AREA CLINIC 86 | Facility: CLINIC | Age: 69
End: 2024-10-14

## 2024-10-14 NOTE — HPI-TODAY'S VISIT:
Dear Tyler Iglesias,  The recent labs are sent to me from October 8 and happy to see that the AST and ALT were normal at 23 and 14.  Bilirubin was slightly elevated at 1.6 but keeping with your history of [place].  Complete blood count was also normal.  We will continue to monitor as we discussed. Brittany Arnold PA-C

## 2024-10-18 ENCOUNTER — LAB OUTSIDE AN ENCOUNTER (OUTPATIENT)
Dept: URBAN - METROPOLITAN AREA TELEHEALTH 2 | Facility: TELEHEALTH | Age: 69
End: 2024-10-18

## 2024-10-31 ENCOUNTER — LAB OUTSIDE AN ENCOUNTER (OUTPATIENT)
Dept: URBAN - METROPOLITAN AREA TELEHEALTH 2 | Facility: TELEHEALTH | Age: 69
End: 2024-10-31

## 2024-11-14 ENCOUNTER — LAB OUTSIDE AN ENCOUNTER (OUTPATIENT)
Dept: URBAN - METROPOLITAN AREA TELEHEALTH 2 | Facility: TELEHEALTH | Age: 69
End: 2024-11-14

## 2025-01-23 ENCOUNTER — WEB ENCOUNTER (OUTPATIENT)
Dept: URBAN - METROPOLITAN AREA CLINIC 86 | Facility: CLINIC | Age: 70
End: 2025-01-23

## 2025-01-30 ENCOUNTER — OFFICE VISIT (OUTPATIENT)
Dept: URBAN - METROPOLITAN AREA TELEHEALTH 2 | Facility: TELEHEALTH | Age: 70
End: 2025-01-30
Payer: MEDICARE

## 2025-01-30 DIAGNOSIS — H40.9 GLAUCOMA: ICD-10-CM

## 2025-01-30 DIAGNOSIS — R03.0 ELEVATED BLOOD PRESSURE: ICD-10-CM

## 2025-01-30 DIAGNOSIS — Q15.9 EYE ABNORMALITY: ICD-10-CM

## 2025-01-30 DIAGNOSIS — Z79.899 HIGH RISK MEDICATION USE: ICD-10-CM

## 2025-01-30 DIAGNOSIS — Z71.89 VACCINE COUNSELING: ICD-10-CM

## 2025-01-30 DIAGNOSIS — E80.4 GILBERT SYNDROME: ICD-10-CM

## 2025-01-30 DIAGNOSIS — K71.9 DRUG INDUCED LIVER DISEASE: ICD-10-CM

## 2025-01-30 DIAGNOSIS — E78.5 HYPERLIPIDEMIA: ICD-10-CM

## 2025-01-30 DIAGNOSIS — R79.89 ABNORMAL LFTS: ICD-10-CM

## 2025-01-30 PROCEDURE — 99214 OFFICE O/P EST MOD 30 MIN: CPT | Performed by: PHYSICIAN ASSISTANT

## 2025-01-30 RX ORDER — ASCORBIC ACID 1000 MG
1 TABLET TABLET ORAL ONCE A DAY
Status: ACTIVE | COMMUNITY

## 2025-01-30 RX ORDER — TIMOLOL/BRIMONIDIN/DORZOLAM/PF 0.5-.15-2%
AS DIRECTED DROPS OPHTHALMIC (EYE)
Status: ACTIVE | COMMUNITY

## 2025-01-30 RX ORDER — ATORVASTATIN CALCIUM 10 MG/1
1 TABLET TABLET, FILM COATED ORAL ONCE A DAY
Status: ACTIVE | COMMUNITY

## 2025-01-30 RX ORDER — LISINOPRIL 10 MG/1
1 TABLET TABLET ORAL ONCE A DAY
Status: ACTIVE | COMMUNITY

## 2025-01-30 RX ORDER — LATANOPROST 50 UG/ML
DROP TO BOTH EYES SOLUTION/ DROPS OPHTHALMIC ONCE A DAY
Status: ACTIVE | COMMUNITY

## 2025-01-30 RX ORDER — METRONIDAZOLE 10 MG/G
1 APPLICATION GEL TOPICAL
Status: ACTIVE | COMMUNITY

## 2025-01-30 NOTE — PHYSICAL EXAM CONSTITUTIONAL:
well developed, well nourished , in no acute distress , ambulating without difficulty , normal communication ability Detail Level: Detailed Quality 431: Preventive Care And Screening: Unhealthy Alcohol Use - Screening: Patient not identified as an unhealthy alcohol user when screened for unhealthy alcohol use using a systematic screening method

## 2025-01-30 NOTE — HPI-TODAY'S VISIT:
Patient is a 69-year-old male and last July 2023 and seeing Balbina Fall and we are asked to see regarding abnormal liver labs and possible immune issues.  A copy of the note will be sent to the referring provider.  1/30/25 he is doing 5 fu cream finished the tx for rmsf in oct and did the doxy and was to do the labs after but he was not checked out after the last visit.  cbc normal  the ast and alt normal. tb 1.4 asma pending  no other new meds   recap The recent labs are sent to me from October 8 and happy to see that the AST and ALT were normal at 23 and 14. Bilirubin was slightly elevated at 1.6 but keeping with your history of [place]. Complete blood count was also normal. We will continue to monitor as we discussed.  Brittany Arnold PA-C  He did labs yesterday and those are pending he went [place] and dx with [place] spotted fever tx for this is doxycycline and will be starting this and had first dose today he will be on this bid x 10 days worrth checking labs at end of treatment and then 1 month off it spikes  July 9 labs showed glucose 76 BUN of 15 creatinine 0.3 sodium 139 potassium 4.5 calcium 9.1 albumin 4.1 bilirubin slightly up at 1.6 but not fractionated and you are known to have Gilbert's.  Alk phos 61 AST 24 and ALT 21 with ideal ALT less than 35.  WBC 5.5 hemoglobin 14 platelet count 232 MCV 97.5 with normal neutrophils and lymphocytes.  Happy to see that your labs are stable.  June 14 ultrasound sent to us.  Liver echogenicity appeared within normal limits with no lesions.  Common bile duct normal at 5 mm.  Gallbladder had no gallstones or thickening of the gallbladder wall.  No significant ascites was seen.  Visualized pancreas portions appeared unremarkable.  Right kidney was 10.4 cm with no hydronephrosis.  Liver vessels were patent as were splenic vessels.  Spleen was normal at 8.7 cm.  Overall they felt that you had a normal exam.  As you recall, your October 6 2023 ultrasound mentioned the liver was normal in contour and top normal in echogenicity and that appears to be doing better at this timeframe.  Please share with local providers.  Dr Magana  4/9/24 telemed    4/5/24 labsThe ASMA remains elevated at 59. Previously 53 and unclear why higher but suggest we still need to continue to monitor you. The bilirubin up at 1.6, previously 1.2. Have you been sick recently? You have history of gilberts and this can happen with illness and fasting. The AST 29 and ALT 28 and previously 24 and 18 so slightly higher and will review at the visit tomorrow.  The 1/25/24 labs were sent to me. The bilirubin 1.2, alkaline [id number], ast 24, alt 18, previously the ast 26, alt 15 so the ast is actually lower now. Seems that there has not been an affect on the labs with the meds.  Brittany Arnold PA-C  reviewed the above and he said he has had some meds for dental procedures and effidex for the skin since last visit  he had clindamycin, effidex, azirthromycin also had to take due to cat bite.  he has had several rounds of abx disucssed asma still positive and need to monitor the labs given this .    recap  He was out of town and did labs 2 days ago and did at [facility] and not back yet.  1/8/24 telemed Dealaxmi Iglesias, Oct 6 ultrasound shows liver to be normal in contour and top normal in echogenicity but with no suspicious lesions. No gallbladder wall thickening or stone seen. Common bile duct normal at 4.8 mm. Right kidney 10.7 cm with no hydronephrosis. Spleen normal at 8.7 cm. Pancreas tail was predominantly obscured by gas but the imagef pancreas portions otherwise were grossly unremarkable. Liver vessels were patent as expected. Splenic vessels were also patent as expected. Overall they saw no signs of any advanced fibrosis or suspicious lesions and liver had patent vessels. I compared this to your January 27 ultrasound and that showed the liver to have normal echogenicity and no discrete lesions at that timeframe.We will need to repeat this ultrasound again in 6 months. Dr Magana Dear Tyler Iglesias,  Your Oct 3 labs shows that the smooth muscle antibody remains elevated at 53 and was also elevated at 53 in April and in December had been 45. Need to watch that trend. Despite this, your liver labs are stable with the albumin normal at 4.4 and bilirubin slightly up at 1.5 with a direct 0.4 which would be very suspicious for Gilbert's syndrome which is that benign conjugation disorder. It tends to be higher when you are fasting and lower when you are not. Your alk phos was normal at 68 AST 34 ALT 23 with ideal ALT less than 35. I would point out that back in April your AST had been 22 and ALT of 17 so while within the low normal range and ideal still they are slightly higher and that is worth watching. I would ask you to repeat the labs again before your January visit to see what that shows. From my notes the main concern that we have for these labs to be up possibly a little bit was that you were taking the cefdinir which is an antibiotic and in its not uncommon for all antibiotics to cause some slight fluctuations in the labs and most people do not perceive it because they do not have a recent baseline to compare to but for you we do have a baseline and noticed this. It is very subtle and not high enough to warrant any medicine change at this time.  he did the labs today and they pending  he had hip replacement 6 weeks ago --may have had abx at that time. not continuos but in ov . also under anesthesia .  he did not take any narcotics after surgery center he was taking gabapentin and meloxicam  he took the meloxicam for about 2-4 weeks. has been off about 1 moth or so   asked about other nsaids and did ahve some topical diclofenac in the past but not currently   U.s was cancelled and not rebooked.  June 27 labs show direct bilirubin 0.3 which is slightly off with total bilirubin 1.5 which means that you have an indirect of 1.2 and that will be consistent with Gilbert's syndrome which is a benign conjugation disorder bilirubin that is seen in about 10% of people. Glucose was 80 BUN of 12 creatinine 0.82 sodium 140 potassium 4.4 calcium 9.4 albumin 4.4. Alk phos normal at 64 AST 22 and ALT 20 with ideal ALT less than 35. WBC 6 hemoglobin 13.7 platelet count 214 MCV 93.4 neutrophils 4048 lymphocytes 1206.   April 6 labs show ASMA actually remaining high at 53 from previous 45 and prior 56.  Desired is less than 20. Albumin 4.3 which is normal and total bilirubin 1.4 elevated with a direct 0.3 and that is compatible with your history of Gilbert's syndrome. As you recall the bilirubin level in [place] tend to be higher if you are fasting. Alkaline phosphatase was down 55 from 66 and prior 78 so good to see that.  AST was slightly lower at 22 and ALT slightly higher at 17.  Ideal ALT less than 35. IgG was normal at 1031 IgM normal at 69 and IgA normal 132 which is good to see.    That means that despite the ASMA being positive there is no sign of any immune activation that was seen.  It does show also how long it can take postexposure to a medication reaction for that immune marker that is triggered to go negative as we are not there yet. He is still on the atorvastatin low dose 10mg po qd.  Pt says he has had a few things happen at the last visit: 1. Started atorvastatin now March 2023 2. Opth added latanoprost for glaucoma x 3m. 3. Had surgery for a finger and needed full anesthesia and some pain meds. Only pain med was at time of surgery. 4. Stepped on cat and bit on calf and had to be on 2 abx for 2 weeks and cefuroxine and metronidazole. He healed well.  January 27 ultrasound shows the liver to have normal echogenicity and no discrete lesions. No gallstones seen. Common bile duct normal at 4.3 mm. Pancreas was normal where seen. Right kidney 10.4 cm with no hydronephrosis. Spleen 8.5 cm. Liver vessels patent as expected. Overall he liver had normal liver echogenicity and no suspicious lesions.  December 13 2022  labs show IgG subclass 4 was normal at 79.2. Albumin was normal at 4.5 bilirubin was 1.2 which is normal with a direct bilirubin 0.3 suggesting that you have [place] syndrome which is a benign conjugation disorder bilirubin seen in about 10% of people.  It tends to run higher when you are fasting for labs and tends to be lower when you are not fasting for labs. Alkaline phosphatase is down to 66 AST stable at 23 and ALT stable at 16. Anti-smooth muscle antibody was lower at 45 from 56 and prior 66 in January of this year. It is still slightly up though. As you recall, we had the prior medicine that was suspected to be causing the liver reaction and its removal improved labs and the hope is for these immune markers to also normalize over time. They did not run the IgM total on you as they apparently had a  labcode change back in June when you were last seen and the old code for IgM was changed without us being advised/aware of to this new code for another test.  We will need to order the IGM test with your next labs. Hepatitis B surface antibody was positive at a strong titer of 469.  Hep A IgM was negative but the hep A total was positive indicating remotely developed immunity being present.  Many times when the hep A total is positive they do a reflex hep A IgM to confirm that it was not a recent antibody development.  He had hepatitis age 6.  Aug 12 u.s shows no gallstones.  Gallbladder wall has normal thickness. Common bile duct normal at 4 mm. Liver was homogeneous/even in echotexture.  No focal liver abnormality was noted. Image pancreas appeared unremarkable. Right kidney measured 10.4 cm with no hydronephrosis. Renal echogenicity was within normal limits. Spleen was normal at 8.4 cm. Liver vessels were patent. Good to note that the u.s did not show any changes.  July 1 labs show albumin 4.5 bilirubin normal at 0.7 and direct bilirubin 0.26.  Alkaline phosphatase 78 AST 23 down from 31.  ALT 17 down from 29.  Back in January AST 56 and ALT 55. Ideal alt less than 35.  As you recall we suspected the doxycycline as the potential cause of this liver issue.  Very happy to see the labs coming down this low off of it.  He had stopped that before the may visit.  Prior mentioned in 2021 he had lost some weight and said his primary did tests and his lfts were up and surprising for him.  Pt says said at time was on some medications and among them was doxycycline and a sleep aid and also atorvastatin and some herbal medications (sleep meds).   He started to remove them and the lfts were coming down and he says most of them in the ok range over time and he says that some that stayed up. GGTP down but the labs up enough that he referred him to Dr Monique and did asma and that was up.   Pt says that the enzymes down and restarted the doxycycline and it went back up and then stopped the doxycycline and dropped some but the asma did not drop and the ggtp did not drop.  Patient seen on January 20, 2022 by Dr. Monique. Patient has abnormal liver labs with labs to continue to show mild elevation at that time.  Patient had an elevated gamma GTP as well as an elevated ASMA.  He discussed the possibility of doing a biopsy. Patient also suspected to have Gilbert syndrome as well. Per Dr. Monique's note patient had a normal CT of the abdomen with regards to the liver. He had no symptoms.  He had been taking daily doxycycline for an ophthalmologic problem as well as n- acetylcysteine for the last 6 to 8 months.  Patient at that visit was noted to have a blood pressure 197/95 (very anxious and has white coat syndrome and says normally ok) and a BMI of 21.67. He says bp normally was good and lateley has days where bp is up 160/90 and has to take some medicine like lisinopril.  Certainly very possible that this could be related to the doxycycline triggering immunity issue. There are case reports that is triggering even overt autoimmune hepatitis.  Patient also did in February 2021 colonoscopy that showed 2 sessile polyps in the ascending colon that were small and removed with cold snare.  Multiple small large mouth diverticuli are seen in sigmoid and descending colon.  October 2021 a smooth muscle antibody of 43 INR 1.0 AST 28 ALT 20 alk phos 75 bilirubin 1.1 direct bilirubin 0.31 and albumin 4.4.  Sodium 141 potassium 4.8 BUN of 13 creatinine 0.86 glucose 99.  White blood cell count 5.5 hemoglobin 14.2 platelet count 265. That was the first time off the meds and those were the normal labs.  January 2022 gamma GTP was elevated at 172 smooth muscle antibody 66 INR 1.1 white cell count 7.7 hemoglobin 14.5 plate count 238 and AST 56 ALT 55 bilirubin 1.2 direct 0.39 alk phos 82 sodium 141 potassium 4.5 BUN of 13 creatinine 0.94 glucose of 88. That was the reexposure time and back on the doxycycline at the time.  February 25, 2022 AST 31 ALT 29 total bili 1.6 direct bilirubin 0.41 albumin 4.6 and smooth muscle antibody 56.  Gamma GTP 66. That was off the meds.  April 2022 did redo labs with primary md and says that the asma and ggtp up and others marginal.  Perlivertox: Doxycycline has been associated with rare instances of hepatic injury, generally arising within 1 to 2 weeks of starting therapy, sometimes with a history of previous administration of the agent without injury. The pattern of injury ranges from hepatocellular to cholestatic and is probably most commonly mixed. The onset is often abrupt and can be accompanied by signs of hypersensitivity, such as fever, rash and eosinophilia (DRESS syndrome). Recovery is usually rapid and usually complete within 4 to 6 weeks. However, instances of severe and prolonged cholestatic liver injury have been reported with oral doxycycline. The autoimmune-like hepatitis that has been described with minocycline has not been linked to doxycycline, despite similarities in chemical structure and similar indications and uses, perhaps because it is used less frequency in a low dose, long term regimen. High dose intravenous doxycycline can cause acute fatty liver typical of that caused by intravenous tetracycline, particularly in susceptible patients such as pregnant women. This type of injury is, however, quite rare. Nevertheless, for these reasons, the duration and dose of parenteral doxycycline therapy should be minimized. Likelihood score: B (highly likely but rare cause of clinically apparent liver injury).  The immune markers may take 3-6 or a 12 months to go back to normal. Sometimes with a rechallenge can worsen the picture.  NAC used by him. It is not toxic to liver. Uses for anxiety. Used custodial in trials for tx liver injury.  Has been on atorvastatin and wants to take a low dose of this daily and would need to look at taking a low dose of this and with close labs. Atorvastatin can also trigger auotimmunity.

## 2025-01-31 ENCOUNTER — WEB ENCOUNTER (OUTPATIENT)
Dept: URBAN - METROPOLITAN AREA CLINIC 86 | Facility: CLINIC | Age: 70
End: 2025-01-31

## 2025-01-31 LAB
A/G RATIO: 2
ABSOLUTE BASOPHILS: 31
ABSOLUTE EOSINOPHILS: 83
ABSOLUTE LYMPHOCYTES: 1352
ABSOLUTE MONOCYTES: 473
ABSOLUTE NEUTROPHILS: 3260
ACTIN (SMOOTH MUSCLE) ANTIBODY (IGG): 43
ALBUMIN: 4.6
ALKALINE PHOSPHATASE: 59
ALT (SGPT): 20
AST (SGOT): 25
BASOPHILS: 0.6
BILIRUBIN, TOTAL: 1.4
BUN/CREATININE RATIO: (no result)
BUN: 14
CALCIUM: 9.3
CARBON DIOXIDE, TOTAL: 26
CHLORIDE: 105
CREATININE: 0.73
EGFR: 98
EOSINOPHILS: 1.6
GLOBULIN, TOTAL: 2.3
GLUCOSE: 84
HEMATOCRIT: 43
HEMOGLOBIN: 14.5
LYMPHOCYTES: 26
MCH: 32.2
MCHC: 33.7
MCV: 95.6
MONOCYTES: 9.1
MPV: 10.3
NEUTROPHILS: 62.7
PLATELET COUNT: 229
POTASSIUM: 4.6
PROTEIN, TOTAL: 6.9
RDW: 12.1
RED BLOOD CELL COUNT: 4.5
SODIUM: 140
WHITE BLOOD CELL COUNT: 5.2

## 2025-05-05 ENCOUNTER — LAB OUTSIDE AN ENCOUNTER (OUTPATIENT)
Dept: URBAN - METROPOLITAN AREA TELEHEALTH 2 | Facility: TELEHEALTH | Age: 70
End: 2025-05-05

## 2025-05-30 ENCOUNTER — LAB OUTSIDE AN ENCOUNTER (OUTPATIENT)
Dept: URBAN - METROPOLITAN AREA TELEHEALTH 2 | Facility: TELEHEALTH | Age: 70
End: 2025-05-30

## 2025-06-24 ENCOUNTER — WEB ENCOUNTER (OUTPATIENT)
Dept: URBAN - METROPOLITAN AREA CLINIC 86 | Facility: CLINIC | Age: 70
End: 2025-06-24

## 2025-06-26 ENCOUNTER — LAB OUTSIDE AN ENCOUNTER (OUTPATIENT)
Dept: URBAN - METROPOLITAN AREA CLINIC 86 | Facility: CLINIC | Age: 70
End: 2025-06-26

## 2025-07-01 ENCOUNTER — WEB ENCOUNTER (OUTPATIENT)
Dept: URBAN - METROPOLITAN AREA CLINIC 86 | Facility: CLINIC | Age: 70
End: 2025-07-01

## 2025-07-02 ENCOUNTER — TELEPHONE ENCOUNTER (OUTPATIENT)
Dept: URBAN - METROPOLITAN AREA CLINIC 86 | Facility: CLINIC | Age: 70
End: 2025-07-02

## 2025-07-07 ENCOUNTER — LAB OUTSIDE AN ENCOUNTER (OUTPATIENT)
Dept: URBAN - METROPOLITAN AREA TELEHEALTH 2 | Facility: TELEHEALTH | Age: 70
End: 2025-07-07

## 2025-07-10 ENCOUNTER — OFFICE VISIT (OUTPATIENT)
Dept: URBAN - METROPOLITAN AREA TELEHEALTH 2 | Facility: TELEHEALTH | Age: 70
End: 2025-07-10

## 2025-07-21 NOTE — PHYSICAL EXAM HENT:
Head,  normocephalic,  atraumatic, alert and oriented to person, place, and time.      Deep Tendon Reflexes: Reflexes are normal and symmetric.   Psychiatric:         Behavior: Behavior normal.                  Educational materials and/or home exercises printed for patient's review and were included in patient instructions on his/her After Visit Summary and given to patient at the end of visit.      Counseled regarding above diagnosis, including possible risks and complications,  especially if left uncontrolled.    Counseled regarding the possible side effects, risks, benefits and alternatives to treatment; patient and/or guardian verbalizes understanding, agrees, feels comfortable with and wishes to proceed with above treatment plan.    Advised patient to call with any new medication issues, and read all Rx info from pharmacy to assure aware of all possible risks and side effects of medication before taking.    Reviewed age and gender appropriate health screening exams and vaccinations.  Advised patient regarding importance of keeping up with recommended health maintenance and to schedule as soon as possible if overdue, as this is important in assessing for undiagnosed pathology, especially cancer, as well as protecting against potentially harmful/life threatening disease.        Patient and/or guardian verbalizes understanding and agrees with above counseling, assessment and plan.    All questions answered.      I have personally reviewed and updated the chief complaint, HPI, Past Medical, Family and Social History, as well as the above Review of Systems.     The patient (or guardian, if applicable) and other individuals in attendance with the patient were advised that Artificial Intelligence will be utilized during this visit to record, process the conversation to generate a clinical note and to support improvement of the AI technology. The patient (or guardian, if applicable) and other individuals in attendance at the appointment consented to

## 2025-07-22 ENCOUNTER — TELEPHONE ENCOUNTER (OUTPATIENT)
Dept: URBAN - METROPOLITAN AREA CLINIC 86 | Facility: CLINIC | Age: 70
End: 2025-07-22

## 2025-07-22 NOTE — HPI-TODAY'S VISIT:
Tyler Iglesias, The recent labs were sent to us.  The immunoglobulin testing was normal.  The ASMA was 47 and prior 43.  Prior to that in April 2024 it was 59.  Still staying positive but given immunoglobulins were normal we will continue to monitor for now.  The creatinine or kidney function is normal.  The bilirubin 1.7, prior 1.4.  Alkaline phosphatase 54, AST 22 and ALT 22.  Prior AST was 25 and ALT 20.  Goal is less than 35.  Complete blood count overall normal platelets normal at 203.  Happy to see the liver enzymes are staying normal.  We will see what the imaging shows us. Brittany Arnold PA-C

## 2025-08-01 ENCOUNTER — OFFICE VISIT (OUTPATIENT)
Dept: URBAN - METROPOLITAN AREA CLINIC 81 | Facility: CLINIC | Age: 70
End: 2025-08-01
Payer: MEDICARE

## 2025-08-01 ENCOUNTER — OFFICE VISIT (OUTPATIENT)
Dept: URBAN - METROPOLITAN AREA CLINIC 81 | Facility: CLINIC | Age: 70
End: 2025-08-01

## 2025-08-01 DIAGNOSIS — K71.9 DRUG INDUCED LIVER DISEASE: ICD-10-CM

## 2025-08-01 DIAGNOSIS — Z79.899 HIGH RISK MEDICATION USE: ICD-10-CM

## 2025-08-01 PROCEDURE — 76705 ECHO EXAM OF ABDOMEN: CPT

## 2025-08-01 PROCEDURE — 93975 VASCULAR STUDY: CPT

## 2025-08-05 ENCOUNTER — TELEPHONE ENCOUNTER (OUTPATIENT)
Dept: URBAN - METROPOLITAN AREA CLINIC 86 | Facility: CLINIC | Age: 70
End: 2025-08-05